# Patient Record
Sex: FEMALE | Race: WHITE | NOT HISPANIC OR LATINO | Employment: OTHER | URBAN - METROPOLITAN AREA
[De-identification: names, ages, dates, MRNs, and addresses within clinical notes are randomized per-mention and may not be internally consistent; named-entity substitution may affect disease eponyms.]

---

## 2021-05-25 ENCOUNTER — APPOINTMENT (OUTPATIENT)
Dept: CT IMAGING | Facility: HOSPITAL | Age: 46
End: 2021-05-25

## 2021-05-25 ENCOUNTER — APPOINTMENT (OUTPATIENT)
Dept: GENERAL RADIOLOGY | Facility: HOSPITAL | Age: 46
End: 2021-05-25

## 2021-05-25 ENCOUNTER — HOSPITAL ENCOUNTER (EMERGENCY)
Facility: HOSPITAL | Age: 46
Discharge: HOME | End: 2021-05-25
Attending: EMERGENCY MEDICINE | Admitting: EMERGENCY MEDICINE

## 2021-05-25 VITALS
SYSTOLIC BLOOD PRESSURE: 146 MMHG | RESPIRATION RATE: 20 BRPM | OXYGEN SATURATION: 96 % | HEIGHT: 71 IN | HEART RATE: 86 BPM | BODY MASS INDEX: 31.5 KG/M2 | WEIGHT: 225 LBS | TEMPERATURE: 98 F | DIASTOLIC BLOOD PRESSURE: 86 MMHG

## 2021-05-25 DIAGNOSIS — S13.4XXA WHIPLASH INJURY TO NECK, INITIAL ENCOUNTER: ICD-10-CM

## 2021-05-25 DIAGNOSIS — S16.1XXA CERVICAL STRAIN, INITIAL ENCOUNTER: Primary | ICD-10-CM

## 2021-05-25 DIAGNOSIS — Z04.1 ENCOUNTER FOR EXAMINATION FOLLOWING MOTOR VEHICLE COLLISION (MVC): ICD-10-CM

## 2021-05-25 DIAGNOSIS — M25.511 ACUTE PAIN OF RIGHT SHOULDER: ICD-10-CM

## 2021-05-25 PROCEDURE — 25010000002 KETOROLAC TROMETHAMINE PER 15 MG: Performed by: PHYSICIAN ASSISTANT

## 2021-05-25 PROCEDURE — 96375 TX/PRO/DX INJ NEW DRUG ADDON: CPT

## 2021-05-25 PROCEDURE — 96374 THER/PROPH/DIAG INJ IV PUSH: CPT

## 2021-05-25 PROCEDURE — 73030 X-RAY EXAM OF SHOULDER: CPT

## 2021-05-25 PROCEDURE — 25010000002 ONDANSETRON PER 1 MG: Performed by: PHYSICIAN ASSISTANT

## 2021-05-25 PROCEDURE — 99283 EMERGENCY DEPT VISIT LOW MDM: CPT

## 2021-05-25 PROCEDURE — 72125 CT NECK SPINE W/O DYE: CPT

## 2021-05-25 RX ORDER — CYCLOBENZAPRINE HCL 10 MG
10 TABLET ORAL ONCE
Status: COMPLETED | OUTPATIENT
Start: 2021-05-25 | End: 2021-05-25

## 2021-05-25 RX ORDER — KETOROLAC TROMETHAMINE 15 MG/ML
15 INJECTION, SOLUTION INTRAMUSCULAR; INTRAVENOUS ONCE
Status: COMPLETED | OUTPATIENT
Start: 2021-05-25 | End: 2021-05-25

## 2021-05-25 RX ORDER — CYCLOBENZAPRINE HCL 10 MG
10 TABLET ORAL 3 TIMES DAILY PRN
Qty: 9 TABLET | Refills: 0 | Status: SHIPPED | OUTPATIENT
Start: 2021-05-25 | End: 2021-05-28

## 2021-05-25 RX ORDER — ONDANSETRON 2 MG/ML
4 INJECTION INTRAMUSCULAR; INTRAVENOUS ONCE
Status: COMPLETED | OUTPATIENT
Start: 2021-05-25 | End: 2021-05-25

## 2021-05-25 RX ADMIN — KETOROLAC TROMETHAMINE 15 MG: 15 INJECTION, SOLUTION INTRAMUSCULAR; INTRAVENOUS at 18:30

## 2021-05-25 RX ADMIN — CYCLOBENZAPRINE HYDROCHLORIDE 10 MG: 10 TABLET, FILM COATED ORAL at 18:29

## 2021-05-25 RX ADMIN — ONDANSETRON 4 MG: 2 INJECTION INTRAMUSCULAR; INTRAVENOUS at 17:48
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
## 2021-06-28 ENCOUNTER — TRANSCRIBE ORDERS (OUTPATIENT)
Dept: ADMINISTRATIVE | Facility: HOSPITAL | Age: 46
End: 2021-06-28

## 2021-06-28 DIAGNOSIS — Z12.31 VISIT FOR SCREENING MAMMOGRAM: Primary | ICD-10-CM

## 2021-07-21 ENCOUNTER — HOSPITAL ENCOUNTER (OUTPATIENT)
Dept: MAMMOGRAPHY | Facility: HOSPITAL | Age: 46
Discharge: HOME | End: 2021-07-21

## 2021-07-21 DIAGNOSIS — Z12.31 VISIT FOR SCREENING MAMMOGRAM: ICD-10-CM

## 2022-08-01 ENCOUNTER — TRANSCRIBE ORDERS (OUTPATIENT)
Dept: ADMINISTRATIVE | Facility: HOSPITAL | Age: 47
End: 2022-08-01

## 2022-08-01 DIAGNOSIS — Z12.31 ENCOUNTER FOR SCREENING MAMMOGRAM FOR BREAST CANCER: Primary | ICD-10-CM

## 2022-10-10 ENCOUNTER — HOSPITAL ENCOUNTER (OUTPATIENT)
Dept: MAMMOGRAPHY | Facility: HOSPITAL | Age: 47
Discharge: HOME | End: 2022-10-10
Admitting: NURSE PRACTITIONER

## 2022-10-10 ENCOUNTER — APPOINTMENT (OUTPATIENT)
Dept: OTHER | Facility: HOSPITAL | Age: 47
End: 2022-10-10

## 2022-10-10 DIAGNOSIS — Z12.31 ENCOUNTER FOR SCREENING MAMMOGRAM FOR BREAST CANCER: ICD-10-CM

## 2022-10-10 PROCEDURE — 77063 BREAST TOMOSYNTHESIS BI: CPT | Performed by: RADIOLOGY

## 2022-10-10 PROCEDURE — 77067 SCR MAMMO BI INCL CAD: CPT

## 2022-10-10 PROCEDURE — 77067 SCR MAMMO BI INCL CAD: CPT | Performed by: RADIOLOGY

## 2022-10-10 PROCEDURE — 77063 BREAST TOMOSYNTHESIS BI: CPT

## 2023-09-12 ENCOUNTER — TRANSCRIBE ORDERS (OUTPATIENT)
Dept: ADMINISTRATIVE | Facility: HOSPITAL | Age: 48
End: 2023-09-12
Payer: COMMERCIAL

## 2023-09-12 DIAGNOSIS — Z12.31 ENCOUNTER FOR SCREENING MAMMOGRAM FOR BREAST CANCER: Primary | ICD-10-CM

## 2023-11-28 ENCOUNTER — HOSPITAL ENCOUNTER (OUTPATIENT)
Dept: MAMMOGRAPHY | Facility: HOSPITAL | Age: 48
Discharge: HOME | End: 2023-11-28
Admitting: NURSE PRACTITIONER
Payer: COMMERCIAL

## 2023-11-28 DIAGNOSIS — Z12.31 ENCOUNTER FOR SCREENING MAMMOGRAM FOR BREAST CANCER: ICD-10-CM

## 2023-11-28 PROCEDURE — 77067 SCR MAMMO BI INCL CAD: CPT

## 2023-11-28 PROCEDURE — 77063 BREAST TOMOSYNTHESIS BI: CPT

## 2024-01-24 ENCOUNTER — HOSPITAL ENCOUNTER (OUTPATIENT)
Dept: MAMMOGRAPHY | Facility: HOSPITAL | Age: 49
Discharge: HOME | End: 2024-01-24
Admitting: RADIOLOGY
Payer: COMMERCIAL

## 2024-01-24 DIAGNOSIS — R92.8 ABNORMAL MAMMOGRAM OF LEFT BREAST: ICD-10-CM

## 2024-01-24 PROCEDURE — G0279 TOMOSYNTHESIS, MAMMO: HCPCS

## 2024-01-24 PROCEDURE — 77065 DX MAMMO INCL CAD UNI: CPT

## 2024-11-20 ENCOUNTER — TRANSCRIBE ORDERS (OUTPATIENT)
Dept: ADMINISTRATIVE | Facility: HOSPITAL | Age: 49
End: 2024-11-20
Payer: COMMERCIAL

## 2024-11-20 DIAGNOSIS — Z12.31 ENCOUNTER FOR SCREENING MAMMOGRAM FOR BREAST CANCER: Primary | ICD-10-CM

## 2024-11-25 LAB
NCCN CRITERIA FLAG: NORMAL
TYRER CUZICK SCORE: 11.7

## 2024-12-23 ENCOUNTER — HOSPITAL ENCOUNTER (OUTPATIENT)
Facility: HOSPITAL | Age: 49
Discharge: HOME | End: 2024-12-23
Admitting: NURSE PRACTITIONER
Payer: COMMERCIAL

## 2024-12-23 DIAGNOSIS — Z12.31 ENCOUNTER FOR SCREENING MAMMOGRAM FOR BREAST CANCER: ICD-10-CM

## 2024-12-23 PROCEDURE — 77067 SCR MAMMO BI INCL CAD: CPT

## 2024-12-23 PROCEDURE — 77063 BREAST TOMOSYNTHESIS BI: CPT

## 2025-02-17 ENCOUNTER — HOSPITAL ENCOUNTER (OUTPATIENT)
Facility: HOSPITAL | Age: 50
Discharge: HOME | End: 2025-02-17
Payer: COMMERCIAL

## 2025-02-17 DIAGNOSIS — R92.8 ABNORMAL MAMMOGRAM: ICD-10-CM

## 2025-02-17 PROCEDURE — 77065 DX MAMMO INCL CAD UNI: CPT

## 2025-02-17 PROCEDURE — 77061 BREAST TOMOSYNTHESIS UNI: CPT | Performed by: RADIOLOGY

## 2025-02-17 PROCEDURE — 77065 DX MAMMO INCL CAD UNI: CPT | Performed by: RADIOLOGY

## 2025-02-17 PROCEDURE — 76642 ULTRASOUND BREAST LIMITED: CPT | Performed by: RADIOLOGY

## 2025-02-17 PROCEDURE — 76642 ULTRASOUND BREAST LIMITED: CPT

## 2025-02-17 PROCEDURE — G0279 TOMOSYNTHESIS, MAMMO: HCPCS

## 2025-02-18 ENCOUNTER — TRANSCRIBE ORDERS (OUTPATIENT)
Dept: ADMINISTRATIVE | Facility: HOSPITAL | Age: 50
End: 2025-02-18
Payer: COMMERCIAL

## 2025-02-18 DIAGNOSIS — R92.8 ABNORMAL MAMMOGRAM: Primary | ICD-10-CM

## 2025-02-27 ENCOUNTER — HOSPITAL ENCOUNTER (OUTPATIENT)
Facility: HOSPITAL | Age: 50
Discharge: HOME | End: 2025-02-27
Payer: COMMERCIAL

## 2025-02-27 DIAGNOSIS — R92.8 ABNORMAL MAMMOGRAM: ICD-10-CM

## 2025-02-27 PROCEDURE — 25010000002 LIDOCAINE 1% - EPINEPHRINE 1:100000 1 %-1:100000 SOLUTION: Performed by: RADIOLOGY

## 2025-02-27 PROCEDURE — 25010000002 LIDOCAINE 1 % SOLUTION: Performed by: RADIOLOGY

## 2025-02-27 PROCEDURE — A4648 IMPLANTABLE TISSUE MARKER: HCPCS

## 2025-02-27 RX ORDER — LIDOCAINE HYDROCHLORIDE AND EPINEPHRINE 10; 10 MG/ML; UG/ML
10 INJECTION, SOLUTION INFILTRATION; PERINEURAL ONCE
Status: COMPLETED | OUTPATIENT
Start: 2025-02-27 | End: 2025-02-27

## 2025-02-27 RX ORDER — LIDOCAINE HYDROCHLORIDE 10 MG/ML
5 INJECTION, SOLUTION INFILTRATION; PERINEURAL ONCE
Status: COMPLETED | OUTPATIENT
Start: 2025-02-27 | End: 2025-02-27

## 2025-02-27 RX ADMIN — LIDOCAINE HYDROCHLORIDE 1 ML: 10 INJECTION, SOLUTION INFILTRATION; PERINEURAL at 10:50

## 2025-02-27 RX ADMIN — LIDOCAINE HYDROCHLORIDE,EPINEPHRINE BITARTRATE 1 ML: 10; .01 INJECTION, SOLUTION INFILTRATION; PERINEURAL at 10:51

## 2025-02-27 NOTE — PROGRESS NOTES
Alert and oriented. Patient's spouse present for post procedure instructions. Denies discomfort, no active bleeding, steri-strips not visualized, gauze dressing intact. Cold pack applied to breast over bandage. Cold pack given. Questions answered, support given. Verbalizes and demonstrates understanding of post-care instructions, written copy given.

## 2025-03-04 ENCOUNTER — TELEPHONE (OUTPATIENT)
Facility: HOSPITAL | Age: 50
End: 2025-03-04
Payer: COMMERCIAL

## 2025-03-04 LAB — REF LAB TEST METHOD: NORMAL

## 2025-03-04 NOTE — TELEPHONE ENCOUNTER
Patient notified of cytology results and recommendation. Verbalizes understanding. Denies discomfort. Denies signs and symptoms of infection. Questions answered, support given, verbalized understanding. [employer] message sent to Dr TALITA Shultz to follow-up on patient being scheduled for left US biopsy. Patient is to call back if she hasn't heard from scheduling by noon on 3.6.25. Verbalized understanding.

## 2025-03-06 ENCOUNTER — TRANSCRIBE ORDERS (OUTPATIENT)
Dept: ADMINISTRATIVE | Facility: HOSPITAL | Age: 50
End: 2025-03-06
Payer: COMMERCIAL

## 2025-03-06 DIAGNOSIS — R92.8 ABNORMAL MAMMOGRAM: Primary | ICD-10-CM

## 2025-03-14 ENCOUNTER — HOSPITAL ENCOUNTER (OUTPATIENT)
Facility: HOSPITAL | Age: 50
Discharge: HOME | End: 2025-03-14
Payer: COMMERCIAL

## 2025-03-14 DIAGNOSIS — R92.8 ABNORMAL MAMMOGRAM: ICD-10-CM

## 2025-03-14 PROCEDURE — A4648 IMPLANTABLE TISSUE MARKER: HCPCS

## 2025-03-14 PROCEDURE — 25010000002 LIDOCAINE 1% - EPINEPHRINE 1:100000 1 %-1:100000 SOLUTION: Performed by: RADIOLOGY

## 2025-03-14 PROCEDURE — 25010000002 LIDOCAINE 1 % SOLUTION: Performed by: RADIOLOGY

## 2025-03-14 RX ORDER — LIDOCAINE HYDROCHLORIDE AND EPINEPHRINE 10; 10 MG/ML; UG/ML
10 INJECTION, SOLUTION INFILTRATION; PERINEURAL ONCE
Status: COMPLETED | OUTPATIENT
Start: 2025-03-14 | End: 2025-03-14

## 2025-03-14 RX ORDER — LIDOCAINE HYDROCHLORIDE 10 MG/ML
5 INJECTION, SOLUTION INFILTRATION; PERINEURAL ONCE
Status: COMPLETED | OUTPATIENT
Start: 2025-03-14 | End: 2025-03-14

## 2025-03-14 RX ADMIN — LIDOCAINE HYDROCHLORIDE 2 ML: 10 INJECTION, SOLUTION INFILTRATION; PERINEURAL at 12:28

## 2025-03-14 RX ADMIN — LIDOCAINE HYDROCHLORIDE,EPINEPHRINE BITARTRATE 1 ML: 10; .01 INJECTION, SOLUTION INFILTRATION; PERINEURAL at 12:29

## 2025-03-14 NOTE — PROGRESS NOTES
"Alert and oriented. Patient's spouse present for post procedure instructions. Denies discomfort, no active bleeding, steri-strips intact.  2 -3 cm healing skin abrasion noted at 2:00 on left breast, per patient \"from paper tape\". Declines gauze dressing & paper tape. Cold pack applied to breast over steri-strips. Cold pack given. Questions answered, support given. Verbalizes and demonstrates understanding of post-care instructions, written copy given. Patient requests Dr TANISHA Petersen for surgical consult if needed.        "

## 2025-03-19 ENCOUNTER — TELEPHONE (OUTPATIENT)
Facility: HOSPITAL | Age: 50
End: 2025-03-19
Payer: COMMERCIAL

## 2025-03-19 LAB
CYTO UR: NORMAL
LAB AP CASE REPORT: NORMAL
LAB AP CLINICAL INFORMATION: NORMAL
LAB AP DIAGNOSIS COMMENT: NORMAL
PATH REPORT.FINAL DX SPEC: NORMAL
PATH REPORT.GROSS SPEC: NORMAL

## 2025-03-19 NOTE — TELEPHONE ENCOUNTER
Referring provider notified via phone call to office. Notified [facility] returned as cancer and patient will be notified. Patient notified of pathology results and recommendation. Verbalizes understanding. Denies discomfort. Denies signs and symptoms of infection.     Patient previously requested  Dr TANISHA Petersen. Patient notified of surgical consult appointment on 4.2.25 @ 1100 with arrival time of 1045, at the [facility], [address]. Patient given office contact & location information. Patient told she will receive an information packet from [facility] with detailed location instructions. Told to bring photo ID, list of prescription & OTC medications, insurance information. May be accompanied by family member or friend for support. Patient verbalized understanding.     Reviewed what would be discussed at surgical consult visit, including detailed explanation of pathology report & imaging reports; treatment options & pros/cons, availability of breast nurse navigator. Patient encouraged to contact surgeon's office with questions or concerns. Breast cancer information packet offered and accepted. Patient verbalized understanding. Patient information sent to breast cancer nurse navigators for evaluation.

## 2025-04-01 ENCOUNTER — TRANSCRIBE ORDERS (OUTPATIENT)
Dept: PHYSICAL THERAPY | Facility: HOSPITAL | Age: 50
End: 2025-04-01
Payer: COMMERCIAL

## 2025-04-01 DIAGNOSIS — Z17.0 MALIGNANT NEOPLASM OF UPPER-INNER QUADRANT OF LEFT BREAST IN FEMALE, ESTROGEN RECEPTOR POSITIVE: Primary | ICD-10-CM

## 2025-04-01 DIAGNOSIS — C50.912 MALIGNANT NEOPLASM OF LEFT FEMALE BREAST, UNSPECIFIED ESTROGEN RECEPTOR STATUS, UNSPECIFIED SITE OF BREAST: Primary | ICD-10-CM

## 2025-04-01 DIAGNOSIS — C50.212 MALIGNANT NEOPLASM OF UPPER-INNER QUADRANT OF LEFT BREAST IN FEMALE, ESTROGEN RECEPTOR POSITIVE: Primary | ICD-10-CM

## 2025-04-02 ENCOUNTER — PATIENT OUTREACH (OUTPATIENT)
Dept: OTHER | Facility: HOSPITAL | Age: 50
End: 2025-04-02
Payer: COMMERCIAL

## 2025-04-02 ENCOUNTER — LAB (OUTPATIENT)
Dept: LAB | Facility: HOSPITAL | Age: 50
End: 2025-04-02
Payer: COMMERCIAL

## 2025-04-02 ENCOUNTER — HOSPITAL ENCOUNTER (OUTPATIENT)
Dept: PHYSICAL THERAPY | Facility: HOSPITAL | Age: 50
Setting detail: THERAPIES SERIES
Discharge: HOME | End: 2025-04-02
Payer: COMMERCIAL

## 2025-04-02 ENCOUNTER — CLINICAL SUPPORT (OUTPATIENT)
Facility: HOSPITAL | Age: 50
End: 2025-04-02
Payer: COMMERCIAL

## 2025-04-02 DIAGNOSIS — Z80.3 FAMILY HISTORY OF MALIGNANT NEOPLASM OF BREAST: ICD-10-CM

## 2025-04-02 DIAGNOSIS — C50.212 MALIGNANT NEOPLASM OF UPPER-INNER QUADRANT OF LEFT BREAST IN FEMALE, ESTROGEN RECEPTOR POSITIVE: Primary | ICD-10-CM

## 2025-04-02 DIAGNOSIS — Z13.79 GENETIC TESTING: Primary | ICD-10-CM

## 2025-04-02 DIAGNOSIS — C50.912 MALIGNANT NEOPLASM OF LEFT BREAST IN FEMALE, ESTROGEN RECEPTOR POSITIVE, UNSPECIFIED SITE OF BREAST: ICD-10-CM

## 2025-04-02 DIAGNOSIS — Z17.0 MALIGNANT NEOPLASM OF LEFT BREAST IN FEMALE, ESTROGEN RECEPTOR POSITIVE, UNSPECIFIED SITE OF BREAST: ICD-10-CM

## 2025-04-02 DIAGNOSIS — Z17.0 MALIGNANT NEOPLASM OF UPPER-INNER QUADRANT OF LEFT BREAST IN FEMALE, ESTROGEN RECEPTOR POSITIVE: Primary | ICD-10-CM

## 2025-04-02 DIAGNOSIS — C50.912 MALIGNANT NEOPLASM OF LEFT FEMALE BREAST, UNSPECIFIED ESTROGEN RECEPTOR STATUS, UNSPECIFIED SITE OF BREAST: Primary | ICD-10-CM

## 2025-04-02 DIAGNOSIS — Z80.41 FAMILY HISTORY OF MALIGNANT NEOPLASM OF OVARY: ICD-10-CM

## 2025-04-02 DIAGNOSIS — Z80.42 FAMILY HISTORY OF MALIGNANT NEOPLASM OF PROSTATE: ICD-10-CM

## 2025-04-02 PROCEDURE — 97162 PT EVAL MOD COMPLEX 30 MIN: CPT

## 2025-04-02 PROCEDURE — 93702 BIS XTRACELL FLUID ANALYSIS: CPT

## 2025-04-02 PROCEDURE — 36415 COLL VENOUS BLD VENIPUNCTURE: CPT

## 2025-04-02 NOTE — THERAPY DISCHARGE NOTE
Outpatient Physical Therapy Lymphedema Initial Evaluation & Discharge  [facility]     Patient Name: Tanvi Barrett  : 1975  MRN: [id number]  Today's Date: 2025      Visit Date: 2025    Visit Dx:    ICD-10-CM ICD-9-CM   1. Malignant neoplasm of left female breast, unspecified estrogen receptor status, unspecified site of breast  C50.912 174.9       There is no problem list on file for this patient.       Past Medical History:   Diagnosis Date    Depression     Hyperlipidemia     Hypertension         Past Surgical History:   Procedure Laterality Date    AUGMENTATION MAMMAPLASTY Bilateral     2021    CHOLECYSTECTOMY          Patient History       Row Name 25 1230             History    Brief Description of Current Complaint BrCA presurgical evaluation  -CA         Pain     Pain at Present 0  -CA         Services    Prior Rehab/Home Health Experiences No  -CA      Are you currently receiving Home Health services No  -CA      Do you plan to receive Home Health services in the near future No  -CA         Daily Activities    Primary Language English  -CA      Are you able to read Yes  -CA      Are you able to write Yes  -CA      How does patient learn best? Pictures/Video;Demonstration;Reading;Listening  -CA      Pt Participated in POC and Goals Yes  -CA         Safety    Are you being hurt, hit, or frightened by anyone at home or in your life? No  -CA      Are you being neglected by a caregiver No  -CA                User Key  (r) = Recorded By, (t) = Taken By, (c) = Cosigned By      Initials Name Provider Type    Denise Mackenzie PT Physical Therapist                     Lymphedema       Row Name 25 5819             Subjective Pain    Able to rate subjective pain? yes  -CA      Pre-Treatment Pain Level 0  -CA      Post-Treatment Pain Level 0  -CA         Subjective    Subjective Comments Pt has been dx with L sided breast CA. She plans to undergo a L lumpectomy with SLNB.  She has h/o breast augmentation. She will be undergoing additional imaging and genetic testing. She presents with her spouse. Notes she has been struggling with her emotions since her diagnosis. She is smiling and joking with her spouse while with PT.  -CA         Lymphedema Assessment    Lymphedema Classification LUE:;at risk/stage 0  -CA      Lymphedema Surgery Comments surgery is TBD  -CA         Posture/Observations    Posture- WNL Posture is WNL  -CA         General ROM    RT Upper Ext Rt Shoulder ABduction;Rt Shoulder Flexion;Rt Shoulder External Rotation;Rt Shoulder Internal Rotation  -CA      LT Upper Ext Lt Shoulder Flexion;Lt Shoulder External Rotation;Lt Shoulder Internal Rotation;Lt Shoulder ABduction  -CA      GENERAL ROM COMMENTS WFL wrist/hand  -CA         Right Upper Ext    Rt Shoulder Abduction AROM WFL  -CA      Rt Shoulder Flexion AROM WFL  -CA      Rt Shoulder External Rotation AROM WFL  -CA      Rt Shoulder Internal Rotation AROM WFL  -CA         Left Upper Ext    Lt Shoulder Abduction AROM WFL  -CA      Lt Shoulder Flexion AROM WFL  -CA      Lt Shoulder External Rotation AROM WFL  -CA      Lt Shoulder Internal Rotation AROM WFL  -CA         MMT (Manual Muscle Testing)    Rt Upper Ext Rt Shoulder Flexion;Rt Shoulder ABduction;Rt Shoulder Internal Rotation;Rt Shoulder External Rotation  -CA      Lt Upper Ext Lt Shoulder Flexion;Lt Shoulder ABduction;Lt Shoulder Internal Rotation;Lt Shoulder External Rotation  -CA         MMT Right Upper Ext    Rt Shoulder Flexion MMT, Gross Movement (4+/5) good plus  -CA      Rt Shoulder ABduction MMT, Gross Movement (4+/5) good plus  -CA      Rt Shoulder Internal Rotation MMT, Gross Movement (4+/5) good plus  -CA      Rt Shoulder External Rotation MMT, Gross Movement (4+/5) good plus  -CA         MMT Left Upper Ext    Lt Shoulder Flexion MMT, Gross Movement (4+/5) good plus  -CA      Lt Shoulder ABduction MMT, Gross Movement (4+/5) good plus  -CA      Lt  Shoulder Internal Rotation MMT, Gross Movement (4+/5) good plus  -CA      Lt Shoulder External Rotation MMT, Gross Movement (4+/5) good plus  -CA         Hand  Strength     Strength Affected Side Bilateral  -CA          Strength Right    Right  Test 1 80  -CA       Strength Average Right 80  -CA          Strength Left    Left  Test 1 70  -CA       Strength Average Left 70  -CA         Lymphedema Edema Assessment    Edema Assessment Comment No edema present at this time.  -CA         Skin Changes/Observations    Location/Assessment Upper Extremity  -CA      Upper Extremity Conditions bilateral:;normal;intact  -CA      Upper Extremity Color/Pigment bilateral:;normal  -CA         Lymphedema Sensation    Lymphedema Sensation Reports RUE:;LUE:  -CA      Lymphedema Sensation Tests light touch  -CA      Lymphedema Light Touch RUE:;LUE:;WNL  -CA         L-Dex Bioimpedence Screening    L-Dex Measurement Extremity LUE  -CA      L-Dex Patient Position Standing  -CA      L-Dex UE Dominate Side Right  -CA      L-Dex UE At Risk Side Left  -CA      L-Dex UE Pre Surgical Value Yes  -CA      L-Dex UE Baseline Score -3.1  -CA      L-Dex UE Comment The patient had SOZO measurement which I reviewed today. The score is in normal limits, see scanned to EMR. Bioimpedance spectroscopy helps identify the onset of lymphedema in an arm or leg before patients experience noticeable swelling. Research has shown that the early detection of lymphedema using L-Dex combined with treatment can reduce progression to chronic lymphedema by 95% in breast cancer patients. Whenever possible, patients are tested for baseline L-Dex score before cancer treatment begins and then are reassessed during regular follow-up visits using the SOZO device. Otherwise, this can be started postoperatively and continued during regular follow-up visits. If the patient’s L-Dex score increases above normal levels, that is a sign that lymphedema  is developing and a referral is made to occupational or physical therapy for further evaluation and early compression treatment. Lymphedema assessment with the SOZO L-Dex score is recommended to be done every 3 months for the first 3 years and then every 6 months for years 4 and 5 followed by annually afterwards.  -CA      Skeletal Muscle Mass (%) 21.7 %  -CA      Fat Mass (%) 42.4 %  -CA      Hy-dex -5.7  -CA                User Key  (r) = Recorded By, (t) = Taken By, (c) = Cosigned By      Initials Name Provider Type    Denise Mackenzie, PT Physical Therapist                    Therapy Education  Education Details: Pt educated on prehab evaluation assessments including bioimpedance. Pt was provided an HEP detailing information including lymphedema, risk reduction, and post op exercise. Pt is concerned about lymphedema, extensive education about monitoring, long term goals, and risk reduction.  Given: HEP, Symptoms/condition management, Posture/body mechanics  Program: New  How Provided: Verbal, Written, Demonstration  Provided to: Patient (and spouse)  Level of Understanding: Verbalized     OP Exercises       Row Name 04/02/25 1230             Subjective    Subjective Comments Pt has been dx with L sided breast CA. She plans to undergo a L lumpectomy with SLNB. She has h/o breast augmentation. She will be undergoing additional imaging and genetic testing. She presents with her spouse. Notes she has been struggling with her emotions since her diagnosis. She is smiling and joking with her spouse while with PT.  -CA         Subjective Pain    Able to rate subjective pain? yes  -CA      Pre-Treatment Pain Level 0  -CA      Post-Treatment Pain Level 0  -CA         Exercise 1    Exercise Name 1 Elbow flexion/extension  -CA      Additional Comments 3-4x/day, HEP level 1- post op day 1 to day 7  -CA         Exercise 2    Exercise Name 2 Fist/wrist circles  -CA      Reps 2 x10  -CA      Additional Comments 3-4x/day,  HEP level 1- post op day 1 to day 7  -CA         Exercise 3    Exercise Name 3 Neck Flexion/extension/rotation/lateral flexion  -CA      Reps 3 x10  -CA      Additional Comments 3-4x/day, HEP level 1- post op day 1 to day 7  -CA         Exercise 4    Exercise Name 4 horizontal abduction with hands behind neck  -CA      Reps 4 x10  -CA      Time 4 5 seconds  -CA      Additional Comments 3-4x/day, HEP level 2- post op day 7 to day 14  -CA         Exercise 5    Exercise Name 5 lumbar trunk rotration  -CA      Reps 5 x10  -CA      Time 5 5 seconds  -CA      Additional Comments 3-4x/day, HEP level 2- post op day 7 to day 14  -CA         Exercise 6    Exercise Name 6 shoulder rolls back  -CA      Reps 6 x10  -CA      Time 6 5 seconds  -CA      Additional Comments 3-4x/day, HEP level 2- post op day 7 to day 14  -CA         Exercise 7    Exercise Name 7 horizontal abduction/adduction with elbows extended  -CA      Reps 7 x10  -CA      Time 7 5  -CA      Additional Comments 3-4x/day, HEP level 2- post op day 7 to day 14  -CA         Exercise 8    Exercise Name 8 wall walks for shoulder flexion  -CA      Reps 8 x10  -CA      Additional Comments 3-4x/day, HEP level 3- post op day 14 until motion is returned  -CA         Exercise 9    Exercise Name 9 IR with hands behind back  -CA      Reps 9 x10  -CA      Additional Comments 3-4x/day, HEP level 3- post op day 14 until motion is returned  -CA         Exercise 10    Exercise Name 10 Horizontal abduction with hands behind head  -CA      Reps 10 x10  -CA      Additional Comments 3-4x/day, HEP level 3- post op day 14 until motion is returned  -CA         Exercise 11    Exercise Name 11 PNF D1 shoulder flexion  -CA      Reps 11 x10  -CA      Additional Comments 3-4x/day, HEP level 3- post op day 14 until motion is returned  -CA         Exercise 12    Exercise Name 12 Trunk stretch with shoulder abduction  -CA      Reps 12 x10  -CA      Time 12 5 seconds  -CA      Additional Comments  3-4x/day, HEP level 3- post op day 14 until motion is returned  -CA                User Key  (r) = Recorded By, (t) = Taken By, (c) = Cosigned By      Initials Name Provider Type    Denise Mackenzie, PT Physical Therapist                     PT OP Goals       Row Name 04/02/25 1230          Long Term Goals    LTG Date to Achieve 04/02/25  -CA     LTG 1 Pt demonstrates awareness of post-operative movement restrictions and HEP to facilitate lymphatic regeneration and reduce the risk of seroma formation, axillary web syndrome, and lymphedema while ensuring shoulder joint mobility.  -CA     LTG 1 Progress Met  -CA     LTG 2 Pt demonstrates understanding of post-operative basic lymphedema precautions  -CA     LTG 2 Progress Met  -CA        Time Calculation    PT Goal Re-Cert Due Date 04/02/25  -CA               User Key  (r) = Recorded By, (t) = Taken By, (c) = Cosigned By      Initials Name Provider Type    Denise Mackenzie, PT Physical Therapist                     PT Assessment/Plan       Row Name 04/02/25 1230          PT Assessment    Assessment Comments This patient presents to PT pre-operatively for planned BrCA surgery scheduled in a couple weeks. Baseline ROM, postural, and bioimpedance measurements were taken today to be compared to measurements retaken 3-4 weeks post surgery. At that time, any reduced movement, decline in function, or postural issues will be addressed with skilled care and new goals will be established.  Personal risk factors for lymphedema post-operatively for the L upper extremity and trunk quadrant were also assessed today and basic lymphedema precautions were discussed. A more detailed discussion regarding personal lymphedema risk factors can take place post-operatively once the number of lymph nodes removed and the plan for further medical care is known.  -CA     Please refer to paper survey for additional self-reported information Yes  -CA     Rehab Potential Good  -CA      Patient/caregiver participated in establishment of treatment plan and goals Yes  -CA     Patient would benefit from skilled therapy intervention Yes  -CA        PT Plan    PT Frequency One time visit  -CA     Planned CPT's? PT EVAL MOD COMPLELITY: 14815;PT BIS XTRACELL FLUID ANALYSIS: 02369  -CA     PT Plan Comments This patient may return to PT 3-4 weeks post operatively for re-evaluation measurements to be compared to measurements taken today, at her pre-operative evaluation. In addition, she can be examined for possible post-BrCA surgery sequelae such as axillary web syndrome, scar adhesions, edema, worsened posture, scapular winging, pain, and reduced ROM and function. At that time, a future plan and goals will be established and skilled care continued if indicated. Currently, she has been provided with information before BrCA surgery in order to facilitate recovery and reduce the risk of post-operative sequelae.  -CA               User Key  (r) = Recorded By, (t) = Taken By, (c) = Cosigned By      Initials Name Provider Type    Denise Mackenzie PT Physical Therapist                     Outcome Measure Options: Quick DASH, Timed Up and Go (TUG)  Quick DASH  Open a tight or new jar.: No Difficulty  Do heavy household chores (e.g., wash walls, wash floors): No Difficulty  Carry a shopping bag or briefcase: No Difficulty  Wash your back: No Difficulty  Use a knife to cut food: No Difficulty  Recreational activities in which you take some force or impact through your arm, should or hand (e.g. golf, hammering, tennis, etc.): No Difficulty  During the past week, to what extent has your arm, shoulder, or hand problem interfered with your normal social activites with family, friends, neighbors or groups?: Not at all  During the past week, were you limited in your work or other regular daily activities as a result of your arm, shoulder or hand problem?: Not limited at all  Arm, Shoulder, or hand pain:  None  Tingling (pins and needles) in your arm, shoulder, or hand: None  During the past week, how much difficulty have you had sleeping because of the pain in your arm, shoulder or hand?: No difficulty  Number of Questions Answered: 11  Quick DASH Score: 0  Timed Up and Go (TUG)  TUG Test 1: 7.18 seconds      Time Calculation:   Start Time: 1230  Stop Time: 1315  Time Calculation (min): 45 min  Untimed Charges  PT Eval/Re-eval Minutes: 45  Total Minutes  Untimed Charges Total Minutes: 45   Total Minutes: 45   Time calculation does not account for 15 minutes documentation time    Therapy Charges for Today       Code Description Service Date Service Provider Modifiers Qty    67192167527 HC PT EVAL MOD COMPLEXITY 4 4/2/2025 Denise Maher, PT GP 1    [id number] HC PT BIS XTRACELL FLUID ANALYSIS 4/2/2025 Denise Maher, PT  1            PT G-Codes  Outcome Measure Options: Quick DASH, Timed Up and Go (TUG)  Quick DASH Score: 0  TUG Test 1: 7.18 seconds            Denise Maher PT  4/2/2025

## 2025-04-02 NOTE — PROGRESS NOTES
Genetic counseling was provided via telehealth.  Patient's name and date of birth was confirmed and confirmed that patient was physically located in [place] at the time of the appointment.    Tanvi Barrett, a 50 y.o. female, was referred for genetic counseling due to a personal history of breast cancer. She was recently diagnosed with a left breast cancer at age 50. Ms. Barrett retains her uterus and ovaries. She had her first colonoscopy last year and polyps were not identified. She was interested in discussing her risk for a hereditary cancer syndrome. Ms. Barrett was interested in pursuing a multigene panel, and therefore the CancerNext panel was ordered through [facility] which analyzes BRCA1/2 and 37 additional genes associated with an increased cancer risk.     FAMILY HISTORY:  Father:   Lung cancer,  late 60s (lifetime smoker)  Pat. Great Aunt: Breast cancer  Pat. Great Aunt: Breast cancer  Pat. Great Aunt: Ovarian cancer  Mat. Uncle:  Prostate cancer, 70s    We do not have medical records confirming the diagnoses in Ms. Barrett's family.    RISK ASSESSMENT: Ms. Barrett's personal history of breast cancer led to concern regarding a hereditary cancer syndrome.  She meets NCCN guidelines criteria for BRCA1/2 testing based on her personal history of breast cancer diagnosed before age 50 and family history of breast and ovarian cancer. The standard approach to genetic testing is through a multigene panel.     GENETIC COUNSELING:  We reviewed the family history information in detail.  Cases of cancer follow three general patterns: sporadic, familial, and hereditary.  While most cancer is sporadic, some cases appear to occur in family clusters.  These cases are said to be familial and account for 10-20% of certain cancer cases.  Familial cases may be due to a combination of shared genes and environmental factors among family members.  In even fewer families (~10%), the cancer is said to be inherited, and the genes  responsible for the cancer are known.      Family histories typical of hereditary cancer syndromes usually include multiple first- and second-degree relatives diagnosed with cancer types that define a syndrome.  These cases tend to be diagnosed at younger-than-expected ages and can be bilateral or multifocal.  The cancer in these families follows an autosomal dominant inheritance pattern, which indicates the likely presence of a mutation in a cancer susceptibility gene.  Children and siblings of an individual believed to carry this mutation have a 50% chance of inheriting that mutation, thereby inheriting the increased risk to develop cancer.  These mutations can be passed down from the maternal or the paternal lineage.    Hereditary breast cancer accounts for 5-10% of all cases of breast cancer.  A significant proportion of hereditary breast and ovarian cancer can be attributed to mutations in the BRCA1 and BRCA2 genes.  Mutations in these genes confer an increased risk for breast cancer, ovarian cancer, male breast cancer, prostate cancer, and pancreatic cancer. Women with a BRCA1 or BRCA2 mutation who have already been diagnosed with breast cancer have a 40-60% lifetime risk of a second breast cancer. Women with a BRCA1 or BRCA2 mutation have up to a 44% risk of ovarian cancer.      The standard approach to genetic testing is via a multigene panel.  Genes included on these panels have varying degrees of risk associated, and management and screening guidelines vary based on the specific gene.  Hereditary cancer syndromes can demonstrate incomplete penetrance and variable expression within families. There are genes that are evaluated that have been more recently described, and there may be less data regarding the risks and therefore may not have established management guidelines at this time. We reviewed that in some cases, the identification of a genetic mutation may impact treatment options for some types of  cancer. We discussed the possibility of results that are unexpected based on family history. We discussed these limitations at length. Based on Ms. Barrett's personal history and her desire to get more information regarding her personal risks and risks for her family, she opted to pursue testing through a panel evaluating several other genes known to increase the risk for cancer.     GENETIC TESTING:  The risks, benefits and limitations of genetic testing and implications for clinical management following testing were reviewed. DNA test results can influence decisions regarding screening and prevention.  Genetic testing can have significant psychological implications for both individuals and families. Also discussed was the possibility of employment and insurance discrimination based on genetic test results and the federal and states laws that are in place to prevent this (BELL), as well as the limitations of these laws.         We discussed multigene panel testing, which would involve testing several genes associated with an increased cancer risk. The benefits and limitations of genetic testing were discussed and Ms. Barrett decided to pursue testing of the genes via the panel. The implications of a positive or negative test result were discussed.  We discussed the possibility that, in some cases, genetic test results may be ambiguous due to the identification of a genetic variant of uncertain significance (VUS). These variants may or may not be associated with an increased cancer risk. With multigene panel testing, it is not uncommon for a VUS to be identified.  If a VUS is identified, testing family members is not recommended and screening recommendations are made based on the family history.  The laboratories that perform genetic testing work to reclassify the VUS and send out an amended report if and when a VUS is reclassified.  The majority of variant findings are ultimately reclassified to a negative result. Given  her personal and family history, a negative test result does not eliminate all cancer risk to her relatives, although the risk would not be as high as it would with positive genetic testing.     PLAN:  Genetic testing via the BRCAplus panel and CancerNext panel through [facility] was ordered. A blood sample will be collected later today at [facility]. Results from the high/moderate risk breast cancer genes (BRCAplus panel) are expected in 7-10 days, and results from the remainder of the panel are expected in 2-3 weeks.  Ms. Barrett is welcome to contact us in the meantime with any questions she may have at [phone].       Ela Moore MS, [facility], [facility]  Licensed Certified Genetic Counselor      Total time spent caring for the patient today was 30 minutes. This time includes chart review, time spent during the visit, and time spent after the visit on documentation and follow-up.

## 2025-04-03 NOTE — SIGNIFICANT NOTE
Met patient and her  with Dr. MUKHERJEE to discuss her recent breast cancer diagnosis. Dr. MUKHERJEE reviewed the pathology of left breast IG IDC T1 N0  M0 stage IA ER+ AK+ Her2- and surgical options. Pt had genetic counseling appt this morning and MRI ordered. NN reviewed Resources and education. Bioimpedence eval completed by MONSE Maher PT.     04/03/25 9646   Nurse Navigation   Current Status Active   Type of Visit New patient   Location of Visit [facility]   Visit Diagnosis Breast - malignant   Referral Source Health professional - outpatient   Treatments Surgery   Date of Diagnosis 03/19/25   Surgery - First Consult Appointment 04/02/25   Barriers to Care Emotional   Practical Needs Nurse Navigator Referral;Social Work Referral   Emotional Needs emotional suppport/coping strategies   Intervention Tasks Performed Education;Symptom management;Community resources;Cancer prevention/Screening;Supportive services referral   Supportive services referral Bioimpedance/Lymphedema;Social Work referral;Genetics referral   Time Navigated Today (Min) 70   Total Time Navigated (Min) 70   Acuity Rating   Time spent with patient 3- greater than 45 minutes   Multimodality treatment coordination and education 2-  Arrange, transfer care, second opinion   Caregiver support 1- Family/significant other support available   Distress score 2- 4-7   Coordination of care  - appts 2- Multiple appts   Appt compliance 1- Compliant   ECOG/Karnofsky Score 1- ECOG -Less than or equal to 1,  Karnofsky 90 or greater   PHQ 9 score  1- <10 clinical   Referrals to support services 3- 2 or greater   Acuity score 16   Acuity level Medium acuity

## 2025-04-04 ENCOUNTER — DOCUMENTATION (OUTPATIENT)
Dept: OTHER | Facility: HOSPITAL | Age: 50
End: 2025-04-04
Payer: COMMERCIAL

## 2025-04-04 NOTE — PROGRESS NOTES
Distress Screening Follow-up    Name: Tanvi Barrett    : 1975    Diagnosis:     Location of Distress Screening: Breast Surgery     Distress Level: 7 (consult with Dr. MUKHERJEE 2025) (2025 10:00 AM)      Physical Concerns:  Sleep: Y  Fatigue: Y      Emotional Concerns:  Worry or anxiety: Y  Sadness or depression: Y  Fear: Y     Interventions:   Emotional Needs: emotional suppport/coping strategies  Practical Needs: Nurse Navigator Referral; Social Work Referral      Comments:  SW called to follow up with pt regarding recent distress screen results. SW left a VM with pt introducing self, and offering additional support. SW provided call back number, ([phone]) and encouraged pt to reach out at her convenience.     SW will remain available to offer support.     JIN Whitley  Oncology Social Worker

## 2025-04-14 ENCOUNTER — DOCUMENTATION (OUTPATIENT)
Dept: GENETICS | Facility: HOSPITAL | Age: 50
End: 2025-04-14
Payer: COMMERCIAL

## 2025-04-14 ENCOUNTER — TELEPHONE (OUTPATIENT)
Dept: GENETICS | Facility: HOSPITAL | Age: 50
End: 2025-04-14
Payer: COMMERCIAL

## 2025-04-14 NOTE — TELEPHONE ENCOUNTER
Spoke with patient and disclosed negative genetic results. Informed patient these results would be on [employer] and sent to her Dr. Patient declined needing a copy mailed to her.

## 2025-04-14 NOTE — PROGRESS NOTES
Tanvi Barrett, a 50 y.o. female, was referred for genetic counseling due to a personal history of breast cancer. She was recently diagnosed with a left breast cancer at age 50. Ms. Barrett retains her uterus and ovaries. She had her first colonoscopy last year and polyps were not identified. She was interested in discussing her risk for a hereditary cancer syndrome. Ms. Barrett was interested in pursuing a multigene panel, and therefore the CancerNext panel was ordered through [facility] which analyzes BRCA1/2 and 37 additional genes associated with an increased cancer risk. The genes on this panel include APC, MARIYA, AXIN2, BAP1, BARD1, BMPR1A, BRCA1, BRCA2, BRIP1, CDH1, CDKN2A, CHEK2, EPCAM, FH, FLCN, GREM1, HOXB13, MBD4, MET, MLH1, MSH2, MSH3, MSH6, MUTYH, NF1, NTHL1, PALB2, PMS2, POLD1, POLE, PTEN, RAD51C, RAD51D, SMAD4, STK11, TP53, TSC1, TSC2, and VHL. Genetic testing was negative for pathogenic mutations in BRCA1/2 and 37 additional genes included on the CancerNext panel. These results were discussed with Ms. Barrett by telephone on 2025.    FAMILY HISTORY:  Father:                         Lung cancer,  late 60s (lifetime smoker)  Pat. Great Aunt: Breast cancer  Pat. Great Aunt: Breast cancer  Pat. Great Aunt: Ovarian cancer  Mat. Uncle:                  Prostate cancer, 70s     We do not have medical records confirming the diagnoses in Ms. Barrett's family.     RISK ASSESSMENT: Ms. Barrett's personal history of breast cancer led to concern regarding a hereditary cancer syndrome.  She meets NCCN guidelines criteria for BRCA1/2 testing based on her personal history of breast cancer diagnosed before age 50 and family history of breast and ovarian cancer. The standard approach to genetic testing is through a multigene panel.      GENETIC COUNSELING:  We reviewed the family history information in detail.  Cases of cancer follow three general patterns: sporadic, familial, and hereditary.  While most cancer is sporadic,  some cases appear to occur in family clusters.  These cases are said to be familial and account for 10-20% of certain cancer cases.  Familial cases may be due to a combination of shared genes and environmental factors among family members.  In even fewer families (~10%), the cancer is said to be inherited, and the genes responsible for the cancer are known.       Family histories typical of hereditary cancer syndromes usually include multiple first- and second-degree relatives diagnosed with cancer types that define a syndrome.  These cases tend to be diagnosed at younger-than-expected ages and can be bilateral or multifocal.  The cancer in these families follows an autosomal dominant inheritance pattern, which indicates the likely presence of a mutation in a cancer susceptibility gene.  Children and siblings of an individual believed to carry this mutation have a 50% chance of inheriting that mutation, thereby inheriting the increased risk to develop cancer.  These mutations can be passed down from the maternal or the paternal lineage.     Hereditary breast cancer accounts for 5-10% of all cases of breast cancer.  A significant proportion of hereditary breast and ovarian cancer can be attributed to mutations in the BRCA1 and BRCA2 genes.  Mutations in these genes confer an increased risk for breast cancer, ovarian cancer, male breast cancer, prostate cancer, and pancreatic cancer. Women with a BRCA1 or BRCA2 mutation who have already been diagnosed with breast cancer have a 40-60% lifetime risk of a second breast cancer. Women with a BRCA1 or BRCA2 mutation have up to a 44% risk of ovarian cancer.       The standard approach to genetic testing is via a multigene panel.  Genes included on these panels have varying degrees of risk associated, and management and screening guidelines vary based on the specific gene.  Hereditary cancer syndromes can demonstrate incomplete penetrance and variable expression within  families. There are genes that are evaluated that have been more recently described, and there may be less data regarding the risks and therefore may not have established management guidelines at this time. We reviewed that in some cases, the identification of a genetic mutation may impact treatment options for some types of cancer. We discussed the possibility of results that are unexpected based on family history. We discussed these limitations at length. Based on Ms. Barrett's personal history and her desire to get more information regarding her personal risks and risks for her family, she opted to pursue testing through a panel evaluating several other genes known to increase the risk for cancer.      GENETIC TESTING:  The risks, benefits and limitations of genetic testing and implications for clinical management following testing were reviewed. DNA test results can influence decisions regarding screening and prevention.  Genetic testing can have significant psychological implications for both individuals and families. Also discussed was the possibility of employment and insurance discrimination based on genetic test results and the federal and states laws that are in place to prevent this (BELL), as well as the limitations of these laws.         We discussed multigene panel testing, which would involve testing several genes associated with an increased cancer risk. The benefits and limitations of genetic testing were discussed and Ms. Barrett decided to pursue testing of the genes via the panel. The implications of a positive or negative test result were discussed.  We discussed the possibility that, in some cases, genetic test results may be ambiguous due to the identification of a genetic variant of uncertain significance (VUS). These variants may or may not be associated with an increased cancer risk. With multigene panel testing, it is not uncommon for a VUS to be identified.  If a VUS is identified, testing family  members is not recommended and screening recommendations are made based on the family history.  The laboratories that perform genetic testing work to reclassify the VUS and send out an amended report if and when a VUS is reclassified.  The majority of variant findings are ultimately reclassified to a negative result. Given her personal and family history, a negative test result does not eliminate all cancer risk to her relatives, although the risk would not be as high as it would with positive genetic testing.     TEST RESULTS:  Genetic testing was negative for pathogenic mutations by sequencing, rearrangement testing, and RNA analysis for the 39 genes on the CancerNext panel.  The negative result greatly lowers the risk of a hereditary cancer syndrome for Ms. Barrett. This assessment is based on the information provided at the time of the consultation.    CLINICAL MANAGEMENT GUIDELINES: Ms. Zaragoza surveillance and management should be determined by her oncology team. Despite the genetic test results that were negative for known pathogenic mutations, Ms. Barrett's female relatives may have a somewhat increased lifetime risk for breast cancer based on family history. Relatives may have a personalized risk assessment performed to quantify their breast cancer risk using a family history-based risk model, such as the Tyrer-Cuzick model. Surveillance for individuals with a high lifetime risk of breast cancer (>20%), based on NCCN guidelines, would consist of semi-annual clinical breast exams and monthly self-breast exams starting by age 18 and annual mammography starting 10 years younger than the earliest diagnosis in the family, or age 40, whichever is earliest.  According to an American Cancer Society expert panel and NCCN guidelines, annual breast MRI should be offered to women whose lifetime risk of breast cancer is 20-25 percent or more, typically beginning at the same age as mammography.     PLAN:  Genetic counseling  remains available to Ms. Barrett and she can contact us with any questions she may have at [phone].         Ela Moore MS, [employer], [facility]  Licensed Certified Genetic Counselor       Cc:  Kelsey Petersen MD

## 2025-04-17 ENCOUNTER — HOSPITAL ENCOUNTER (OUTPATIENT)
Dept: MRI IMAGING | Facility: HOSPITAL | Age: 50
Discharge: HOME | End: 2025-04-17
Admitting: SURGERY
Payer: COMMERCIAL

## 2025-04-17 DIAGNOSIS — C50.412 CARCINOMA OF UPPER-OUTER QUADRANT OF FEMALE BREAST, LEFT: ICD-10-CM

## 2025-04-17 PROCEDURE — 77049 MRI BREAST C-+ W/CAD BI: CPT

## 2025-04-17 PROCEDURE — 25510000002 GADOBENATE DIMEGLUMINE 529 MG/ML SOLUTION: Performed by: SURGERY

## 2025-04-17 PROCEDURE — A9577 INJ MULTIHANCE: HCPCS | Performed by: SURGERY

## 2025-04-17 RX ADMIN — GADOBENATE DIMEGLUMINE 19 ML: 529 INJECTION, SOLUTION INTRAVENOUS at 11:44

## 2025-04-18 ENCOUNTER — TELEPHONE (OUTPATIENT)
Dept: MRI IMAGING | Facility: HOSPITAL | Age: 50
End: 2025-04-18
Payer: COMMERCIAL

## 2025-04-21 ENCOUNTER — TRANSCRIBE ORDERS (OUTPATIENT)
Dept: ADMINISTRATIVE | Facility: HOSPITAL | Age: 50
End: 2025-04-21
Payer: COMMERCIAL

## 2025-04-21 DIAGNOSIS — C50.412 MALIGNANT NEOPLASM OF UPPER-OUTER QUADRANT OF LEFT FEMALE BREAST, UNSPECIFIED ESTROGEN RECEPTOR STATUS: Primary | ICD-10-CM

## 2025-05-07 ENCOUNTER — PRE-ADMISSION TESTING (OUTPATIENT)
Dept: PREADMISSION TESTING | Facility: HOSPITAL | Age: 50
End: 2025-05-07
Payer: COMMERCIAL

## 2025-05-07 LAB
ALBUMIN SERPL-MCNC: 4.5 G/DL (ref 3.5–5.2)
ALBUMIN/GLOB SERPL: 1.7 G/DL
ALP SERPL-CCNC: 88 U/L (ref 39–117)
ALT SERPL W P-5'-P-CCNC: 97 U/L (ref 1–33)
ANION GAP SERPL CALCULATED.3IONS-SCNC: 11 MMOL/L (ref 5–15)
AST SERPL-CCNC: 69 U/L (ref 1–32)
BILIRUB SERPL-MCNC: 0.7 MG/DL (ref 0–1.2)
BUN SERPL-MCNC: 13 MG/DL (ref 6–20)
BUN/CREAT SERPL: 11.9 (ref 7–25)
CALCIUM SPEC-SCNC: 9.9 MG/DL (ref 8.6–10.5)
CHLORIDE SERPL-SCNC: 101 MMOL/L (ref 98–107)
CO2 SERPL-SCNC: 25 MMOL/L (ref 22–29)
CREAT SERPL-MCNC: 1.09 MG/DL (ref 0.57–1)
DEPRECATED RDW RBC AUTO: 42.4 FL (ref 37–54)
EGFRCR SERPLBLD CKD-EPI 2021: 62.4 ML/MIN/1.73
ERYTHROCYTE [DISTWIDTH] IN BLOOD BY AUTOMATED COUNT: 12.5 % (ref 12.3–15.4)
GLOBULIN UR ELPH-MCNC: 2.6 GM/DL
GLUCOSE SERPL-MCNC: 90 MG/DL (ref 65–99)
HCT VFR BLD AUTO: 44 % (ref 34–46.6)
HGB BLD-MCNC: 14.8 G/DL (ref 12–15.9)
MCH RBC QN AUTO: 31.6 PG (ref 26.6–33)
MCHC RBC AUTO-ENTMCNC: 33.6 G/DL (ref 31.5–35.7)
MCV RBC AUTO: 93.8 FL (ref 79–97)
PLATELET # BLD AUTO: 269 10*3/MM3 (ref 140–450)
PMV BLD AUTO: 9.4 FL (ref 6–12)
POTASSIUM SERPL-SCNC: 4.3 MMOL/L (ref 3.5–5.2)
PROT SERPL-MCNC: 7.1 G/DL (ref 6–8.5)
RBC # BLD AUTO: 4.69 10*6/MM3 (ref 3.77–5.28)
SODIUM SERPL-SCNC: 137 MMOL/L (ref 136–145)
WBC NRBC COR # BLD AUTO: 7.68 10*3/MM3 (ref 3.4–10.8)

## 2025-05-07 PROCEDURE — 80053 COMPREHEN METABOLIC PANEL: CPT

## 2025-05-07 PROCEDURE — 93005 ELECTROCARDIOGRAM TRACING: CPT

## 2025-05-07 PROCEDURE — 85027 COMPLETE CBC AUTOMATED: CPT

## 2025-05-07 PROCEDURE — 36415 COLL VENOUS BLD VENIPUNCTURE: CPT

## 2025-05-07 NOTE — PAT
An arrival time for procedure was not provided during PAT visit. If patient had any questions or concerns about their arrival time, they were instructed to contact their surgeon/physician.  Additionally, if the patient referred to an arrival time that was acquired from their my chart account, patient was encouraged to verify that time with their surgeon/physician. Arrival times are NOT provided in Pre Admission Testing Department.    EKG from PAT today faxed to anesthesiology department for review and cardiac clearance. RN spoke with  Dr. Vick  and reviewed pertinent medical history and EKG results.  Per Dr. Vick, patient is cleared to proceed with procedure as planned without additional cardiac testing. Patient denies chest pain or increased shortness of breath.

## 2025-05-08 LAB
QT INTERVAL: 350 MS
QTC INTERVAL: 446 MS

## 2025-05-12 ENCOUNTER — HOSPITAL ENCOUNTER (OUTPATIENT)
Dept: ULTRASOUND IMAGING | Facility: HOSPITAL | Age: 50
Discharge: HOME | End: 2025-05-12
Payer: COMMERCIAL

## 2025-05-12 ENCOUNTER — HOSPITAL ENCOUNTER (OUTPATIENT)
Dept: MAMMOGRAPHY | Facility: HOSPITAL | Age: 50
Discharge: HOME | End: 2025-05-12
Payer: COMMERCIAL

## 2025-05-12 ENCOUNTER — HOSPITAL ENCOUNTER (OUTPATIENT)
Dept: NUCLEAR MEDICINE | Facility: HOSPITAL | Age: 50
Discharge: HOME | End: 2025-05-12

## 2025-05-12 DIAGNOSIS — C50.412 MALIGNANT NEOPLASM OF UPPER-OUTER QUADRANT OF LEFT FEMALE BREAST, UNSPECIFIED ESTROGEN RECEPTOR STATUS: ICD-10-CM

## 2025-05-12 PROCEDURE — 88341 IMHCHEM/IMCYTCHM EA ADD ANTB: CPT | Performed by: SURGERY

## 2025-05-12 PROCEDURE — 34310000005 TECHNETIUM FILTERED SULFUR COLLOID: Performed by: SURGERY

## 2025-05-12 PROCEDURE — A9541 TC99M SULFUR COLLOID: HCPCS | Performed by: SURGERY

## 2025-05-12 PROCEDURE — 88305 TISSUE EXAM BY PATHOLOGIST: CPT | Performed by: SURGERY

## 2025-05-12 PROCEDURE — 88307 TISSUE EXAM BY PATHOLOGIST: CPT | Performed by: SURGERY

## 2025-05-12 PROCEDURE — 25010000002 LIDOCAINE 1 % SOLUTION: Performed by: RADIOLOGY

## 2025-05-12 PROCEDURE — C1819 TISSUE LOCALIZATION-EXCISION: HCPCS

## 2025-05-12 PROCEDURE — 38792 RA TRACER ID OF SENTINL NODE: CPT

## 2025-05-12 PROCEDURE — 76098 X-RAY EXAM SURGICAL SPECIMEN: CPT

## 2025-05-12 RX ORDER — LIDOCAINE HYDROCHLORIDE 10 MG/ML
5 INJECTION, SOLUTION INFILTRATION; PERINEURAL ONCE
Status: COMPLETED | OUTPATIENT
Start: 2025-05-12 | End: 2025-05-12

## 2025-05-12 RX ADMIN — Medication 1 ML: at 08:41

## 2025-05-12 RX ADMIN — TECHNETIUM TC 99M SULFUR COLLOID 1 DOSE: KIT at 14:34

## 2025-05-13 ENCOUNTER — LAB REQUISITION (OUTPATIENT)
Dept: LAB | Facility: HOSPITAL | Age: 50
End: 2025-05-13
Payer: COMMERCIAL

## 2025-05-13 DIAGNOSIS — C50.412 MALIGNANT NEOPLASM OF UPPER-OUTER QUADRANT OF LEFT FEMALE BREAST: ICD-10-CM

## 2025-05-16 LAB
CYTO UR: NORMAL
LAB AP CASE REPORT: NORMAL
LAB AP CLINICAL INFORMATION: NORMAL
LAB AP DIAGNOSIS COMMENT: NORMAL
LAB AP SYNOPTIC CHECKLIST: NORMAL
PATH REPORT.FINAL DX SPEC: NORMAL
PATH REPORT.GROSS SPEC: NORMAL

## 2025-05-20 NOTE — PROGRESS NOTES
"  Subjective     PROBLEM LIST:  [id number] ER positive (95%, 3+) IA positive (95%, 3+) HER2 negative (1+) invasive ductal carcinoma of the left breast  Left breast lumpectomy on 5/12/2025.  Pathology showed an intermediate grade invasive ductal carcinoma measuring 9 mm.  Less than 0.1 mm anterior margin.  0 of 2 sentinel lymph nodes involved.  Depression  Hyperlipidemia  Hypertension    CHIEF COMPLAINT: Breast cancer      HISTORY OF PRESENT ILLNESS:  The patient is a 50 y.o. female, referred for evaluation of a recently diagnosed breast cancer.    She has had a lumpectomy.  She is recovering well from this.  She is here with her  today.  She lives in [place] and works as a .  They have 1 son still at home who is 18 and others who are grown.    She had a uterine ablation in 2018 for menorrhagia.  She started having symptoms of joint pain and mood changes in her late 30s and has had hot flashes for the past year.  She did have some bleeding after her uterine ablation but no bleeding for the past year.    REVIEW OF SYSTEMS:  A 14 point review of systems was performed and is negative except as noted above.    Past Medical History:   Diagnosis Date    Breast cancer 2025    Left    Depression     Hyperlipidemia     Hypertension                Objective     /80   Pulse 116   Resp 20   Ht 177.8 cm (70\")   Wt 104 kg (230 lb)   BMI 33.00 kg/m²   Performance Status:  ECOG score: 0           General: well appearing female in no acute distress  Neuro: alert and oriented  HEENT: sclerae anicteric, oropharynx clear  Skin: no rashes, lesions, bruising, or petechiae  Psych: mood and affect appropriate    Lab Results   Component Value Date    WBC 7.68 05/07/2025    HGB 14.8 05/07/2025    HCT 44.0 05/07/2025    MCV 93.8 05/07/2025     05/07/2025     Lab Results   Component Value Date    GLUCOSE 90 05/07/2025    BUN 13 05/07/2025    CREATININE 1.09 (H) 05/07/2025    BCR 11.9 05/07/2025    K 4.3 " "05/07/2025    CO2 25.0 05/07/2025    CALCIUM 9.9 05/07/2025    ALBUMIN 4.5 05/07/2025    AST 69 (H) 05/07/2025    ALT 97 (H) 05/07/2025       Mammo Post Device Placement Left  ULTRASOUND GUIDED NEEDLE LOCALIZATION: Left breast     HISTORY: Localization of known biopsy-proven malignancy left breast     PROCEDURE: After obtaining informed consent and performing \"time-out\"  the left breast was prepped and draped in usual sterile fashion. 1%  lidocaine without epinephrine was utilized for local anesthesia. A 5 cm  Kopans needle was placed into the lateral position under direct  sonographic guidance, a wire was then placed through the needle and the  needle was removed from the breast.     Routine left digital mammographic images were obtained with a BB placed  on the skin edge. The mass is located at the distal portion of the thick  part of the wire, about 4 cm from the lateral skin edge. Mammographic  images were available to the operating room. No complications occurred  during this procedure.     SUMMARY: Successful needle localization of known biopsy-proven  malignancy left breast at 11:00.     No BI-RADS category is necessary.     Pathology demonstrated invasive ductal carcinoma, intermediate grade, 9  mm in maximal extent. Limited associated ductal carcinoma in situ, high  grade, micropapillary pattern, not extensive. Invasive carcinoma present  within less than 0.1 mm of anterior margin. DCIS present 2 mm from  anterior margin.     Surgical consultation is recommended.        5/19/2025 9:29 AM by Jess Shultz MD on Workstation: [id number]               ASSESSMENT AND PLAN:     Tanvi Barrett is a 50 y.o. female with a stage Ia ER positive HER2 negative invasive ductal carcinoma of the left breast.    We discussed the use of the Oncotype recurrence score.  This test provides information about the biology and risk of recurrence for ER positive Her2-negative breast cancers.  It is clear from previous studies " that patients who have a low risk Oncotype do not benefit from adjuvant chemotherapy.  Conversely, patients with a high risk Oncotype can have a significant benefit from adjuvant chemotherapy.  Scores in the intermediate risk group may have a small benefit based on the patient's age.  We discussed that the Oncotype test is most useful if chemotherapy is an option that the patient is considering.     Regardless of the decision about chemotherapy, she is a candidate for adjuvant endocrine therapy.   Based on her symptoms and history I think she likely is in menopause and we will do hormone levels today to confirm this.  We discussed adjuvant endocrine therapy with an aromatase inhibitor.  We reviewed the potential side effects of anastrozole including hot flashes, vaginal dryness, joint pain, decrease in bone density, and mood or sleep disturbance.  She is concerned about many of the side effects as she already is experiencing many of the symptoms.  I told her that every person is different in terms of how much this medication affects them and I would encourage her to try the medicine before making any decisions about continuing for a longer period of time.  We also discussed that there is things we can do to help manage many of the side effects.  ]    F/u 2 weeks to review oncotype result.             A total greater than 60 mins minutes was spent in face to face patient time, examination, counseling, charting, reviewing test results, and reviewing outside records.    Estella Craig MD    5/21/2025

## 2025-05-21 ENCOUNTER — LAB (OUTPATIENT)
Dept: LAB | Facility: HOSPITAL | Age: 50
End: 2025-05-21
Payer: COMMERCIAL

## 2025-05-21 ENCOUNTER — OFFICE VISIT (OUTPATIENT)
Dept: RADIATION ONCOLOGY | Facility: HOSPITAL | Age: 50
End: 2025-05-21
Payer: COMMERCIAL

## 2025-05-21 ENCOUNTER — HOSPITAL ENCOUNTER (OUTPATIENT)
Dept: RADIATION ONCOLOGY | Facility: HOSPITAL | Age: 50
Setting detail: RADIATION/ONCOLOGY SERIES
Discharge: HOME | End: 2025-05-21
Payer: COMMERCIAL

## 2025-05-21 ENCOUNTER — CONSULT (OUTPATIENT)
Dept: ONCOLOGY | Facility: CLINIC | Age: 50
End: 2025-05-21
Payer: COMMERCIAL

## 2025-05-21 VITALS
DIASTOLIC BLOOD PRESSURE: 80 MMHG | HEIGHT: 70 IN | HEART RATE: 116 BPM | WEIGHT: 230 LBS | RESPIRATION RATE: 20 BRPM | SYSTOLIC BLOOD PRESSURE: 144 MMHG | BODY MASS INDEX: 32.93 KG/M2

## 2025-05-21 VITALS
RESPIRATION RATE: 20 BRPM | SYSTOLIC BLOOD PRESSURE: 144 MMHG | BODY MASS INDEX: 32.93 KG/M2 | HEART RATE: 116 BPM | WEIGHT: 230 LBS | HEIGHT: 70 IN | DIASTOLIC BLOOD PRESSURE: 80 MMHG

## 2025-05-21 DIAGNOSIS — C50.212 MALIGNANT NEOPLASM OF UPPER-INNER QUADRANT OF LEFT BREAST IN FEMALE, ESTROGEN RECEPTOR POSITIVE: Primary | ICD-10-CM

## 2025-05-21 DIAGNOSIS — C50.912 MALIGNANT NEOPLASM OF LEFT BREAST IN FEMALE, ESTROGEN RECEPTOR POSITIVE, UNSPECIFIED SITE OF BREAST: Primary | ICD-10-CM

## 2025-05-21 DIAGNOSIS — Z17.0 MALIGNANT NEOPLASM OF LEFT BREAST IN FEMALE, ESTROGEN RECEPTOR POSITIVE, UNSPECIFIED SITE OF BREAST: Primary | ICD-10-CM

## 2025-05-21 DIAGNOSIS — Z17.0 MALIGNANT NEOPLASM OF UPPER-INNER QUADRANT OF LEFT BREAST IN FEMALE, ESTROGEN RECEPTOR POSITIVE: Primary | ICD-10-CM

## 2025-05-21 PROCEDURE — 83001 ASSAY OF GONADOTROPIN (FSH): CPT | Performed by: INTERNAL MEDICINE

## 2025-05-21 PROCEDURE — 82670 ASSAY OF TOTAL ESTRADIOL: CPT | Performed by: INTERNAL MEDICINE

## 2025-05-21 PROCEDURE — G0463 HOSPITAL OUTPT CLINIC VISIT: HCPCS

## 2025-05-21 PROCEDURE — 36415 COLL VENOUS BLD VENIPUNCTURE: CPT | Performed by: INTERNAL MEDICINE

## 2025-05-21 RX ORDER — VENLAFAXINE HCL 75 MG
75 CAPSULE, EXT RELEASE 24 HR ORAL DAILY
COMMUNITY
Start: 2024-12-16

## 2025-05-21 NOTE — LETTER
"May 21, 2025     JENNIFER Ivey  [address]  [facility] [address]    Patient: Tanvi Barrett   YOB: 1975   Date of Visit: 5/21/2025     Dear JENNIFER Ivey:       Thank you for referring Tanvi Barrett to me for evaluation. Below are the relevant portions of my assessment and plan of care.    If you have questions, please do not hesitate to call me. I look forward to following Tanvi along with you.         Sincerely,        Estella Craig MD        CC: MD Kiara Wagoner Amy M, MD  05/21/25 0197  Sign when Signing Visit    Subjective    PROBLEM LIST:  [id number] ER positive (95%, 3+) CT positive (95%, 3+) HER2 negative (1+) invasive ductal carcinoma of the left breast  Left breast lumpectomy on 5/12/2025.  Pathology showed an intermediate grade invasive ductal carcinoma measuring 9 mm.  Less than 0.1 mm anterior margin.  0 of 2 sentinel lymph nodes involved.  Depression  Hyperlipidemia  Hypertension    CHIEF COMPLAINT: Breast cancer      HISTORY OF PRESENT ILLNESS:  The patient is a 50 y.o. female, referred for evaluation of a recently diagnosed breast cancer.    She has had a lumpectomy.  She is recovering well from this.  She is here with her  today.  She lives in [place] and works as a .  They have 1 son still at home who is 18 and others who are grown.    She had a uterine ablation in 2018 for menorrhagia.  She started having symptoms of joint pain and mood changes in her late 30s and has had hot flashes for the past year.  She did have some bleeding after her uterine ablation but no bleeding for the past year.    REVIEW OF SYSTEMS:  A 14 point review of systems was performed and is negative except as noted above.    Past Medical History:   Diagnosis Date   • Breast cancer 2025    Left   • Depression    • Hyperlipidemia    • Hypertension                Objective    /80   Pulse 116   Resp 20   Ht 177.8 cm (70\")   Wt 104 kg (230 " "lb)   BMI 33.00 kg/m²   Performance Status:  ECOG score: 0           General: well appearing female in no acute distress  Neuro: alert and oriented  HEENT: sclerae anicteric, oropharynx clear  Skin: no rashes, lesions, bruising, or petechiae  Psych: mood and affect appropriate    Lab Results   Component Value Date    WBC 7.68 05/07/2025    HGB 14.8 05/07/2025    HCT 44.0 05/07/2025    MCV 93.8 05/07/2025     05/07/2025     Lab Results   Component Value Date    GLUCOSE 90 05/07/2025    BUN 13 05/07/2025    CREATININE 1.09 (H) 05/07/2025    BCR 11.9 05/07/2025    K 4.3 05/07/2025    CO2 25.0 05/07/2025    CALCIUM 9.9 05/07/2025    ALBUMIN 4.5 05/07/2025    AST 69 (H) 05/07/2025    ALT 97 (H) 05/07/2025       Mammo Post Device Placement Left  ULTRASOUND GUIDED NEEDLE LOCALIZATION: Left breast     HISTORY: Localization of known biopsy-proven malignancy left breast     PROCEDURE: After obtaining informed consent and performing \"time-out\"  the left breast was prepped and draped in usual sterile fashion. 1%  lidocaine without epinephrine was utilized for local anesthesia. A 5 cm  Kopans needle was placed into the lateral position under direct  sonographic guidance, a wire was then placed through the needle and the  needle was removed from the breast.     Routine left digital mammographic images were obtained with a BB placed  on the skin edge. The mass is located at the distal portion of the thick  part of the wire, about 4 cm from the lateral skin edge. Mammographic  images were available to the operating room. No complications occurred  during this procedure.     SUMMARY: Successful needle localization of known biopsy-proven  malignancy left breast at 11:00.     No BI-RADS category is necessary.     Pathology demonstrated invasive ductal carcinoma, intermediate grade, 9  mm in maximal extent. Limited associated ductal carcinoma in situ, high  grade, micropapillary pattern, not extensive. Invasive carcinoma " present  within less than 0.1 mm of anterior margin. DCIS present 2 mm from  anterior margin.     Surgical consultation is recommended.        5/19/2025 9:29 AM by Jess Shultz MD on Workstation: [id number]               ASSESSMENT AND PLAN:     Tanvi Barrett is a 50 y.o. female with a stage Ia ER positive HER2 negative invasive ductal carcinoma of the left breast.    We discussed the use of the Oncotype recurrence score.  This test provides information about the biology and risk of recurrence for ER positive Her2-negative breast cancers.  It is clear from previous studies that patients who have a low risk Oncotype do not benefit from adjuvant chemotherapy.  Conversely, patients with a high risk Oncotype can have a significant benefit from adjuvant chemotherapy.  Scores in the intermediate risk group may have a small benefit based on the patient's age.  We discussed that the Oncotype test is most useful if chemotherapy is an option that the patient is considering.     Regardless of the decision about chemotherapy, she is a candidate for adjuvant endocrine therapy.   Based on her symptoms and history I think she likely is in menopause and we will do hormone levels today to confirm this.  We discussed adjuvant endocrine therapy with an aromatase inhibitor.  We reviewed the potential side effects of anastrozole including hot flashes, vaginal dryness, joint pain, decrease in bone density, and mood or sleep disturbance.  She is concerned about many of the side effects as she already is experiencing many of the symptoms.  I told her that every person is different in terms of how much this medication affects them and I would encourage her to try the medicine before making any decisions about continuing for a longer period of time.  We also discussed that there is things we can do to help manage many of the side effects.  ]    F/u 2 weeks to review oncotype result.             A total greater than 60 mins minutes  was spent in face to face patient time, examination, counseling, charting, reviewing test results, and reviewing outside records.    Estella Craig MD    5/21/2025

## 2025-05-21 NOTE — PROGRESS NOTES
New Patient Office Visit      Encounter Date: 05/21/2025   Patient Name: Tanvi Barrett  YOB: 1975   Medical Record Number: [id number]   Primary Diagnosis: Malignant neoplasm of left breast in female, estrogen receptor positive, unspecified site of breast [C50.912, Z17.0]   Cancer Staging: Cancer Staging   No matching staging information was found for the patient.      Chief Complaint:    Chief Complaint   Patient presents with    Breast Cancer       History of Present Illness: Tanvi Barrett is a 50 y.o. female who is here for consultation regarding her +/+/- int grade invasive ductal carcinoma of the left breast. This was diagnosed following abnormal screening mammography. Diagnostic mammography/US, US guided biopsy results, and bilateral breast MRI results have been reviewed.   She had a left lumpectomy and SLN eval on 5/12/24. This revealed a 9 mm int grade IDC with limited associated DCIS. Extended margins were taken - closest distance to invasive cancer was .1mm and to DCIS was 2mm. No LVI. 0/2 SLN involved. pT1bN0(sn)  She is healing well. No fever, discharge, drainage.  She is accompanied by her supportive . They are both very active and enjoy travelling.     Age at menarche:  11  Age at menopause:  49  Hormone replacement therapy:  No  Personal history of breast cancer:  No  Family history of breast cancer:  No  Radiation to chest before age of 31:  No  Age of first live birth:  28    Prior Radiation History: no  Pacemaker or ICD: no  Pregnant or Nursing: no    Subjective      Review of Systems: Review of Systems   Constitutional:  Positive for fatigue.   Endocrine: Positive for heat intolerance.        Pt reports hot flashes that began 1 year ago w/ the beginning of menopause       Past Medical History:   Past Medical History:   Diagnosis Date    Breast cancer 2025    Left    Depression     Hyperlipidemia     Hypertension        Past  "Surgical History:   Past Surgical History:   Procedure Laterality Date    AUGMENTATION MAMMAPLASTY Bilateral     AUGUST 2021    BREAST LUMPECTOMY Left 05/12/2025    CHOLECYSTECTOMY      LIPOSUCTION ABDOMEN AND FLANK  08/2021    \"Tummy Tuck\" & Abdominal Liposuction       Family History:   Family History   Problem Relation Age of Onset    Lung cancer Father     Breast cancer Neg Hx        Social History:   Social History     Socioeconomic History    Marital status:    Tobacco Use    Smoking status: Never    Smokeless tobacco: Never   Vaping Use    Vaping status: Never Used   Substance and Sexual Activity    Alcohol use: Yes     Comment: socially     Drug use: Never    Sexual activity: Defer       Medications:     Current Outpatient Medications:     Atorvastatin Calcium (LIPITOR PO), Take  by mouth., Disp: , Rfl:     Effexor XR 75 MG 24 hr capsule, Take 1 capsule by mouth Daily., Disp: , Rfl:     LISINOPRIL PO, Take  by mouth., Disp: , Rfl:     HYDROcodone-acetaminophen (NORCO) 5-325 MG per tablet, Take 1 tablet by mouth 3 times a day., Disp: 7 tablet, Rfl: 0    Allergies:   No Known Allergies      Advanced Care Plan: N Advanced Care Planning was discussed. The patient does not have a living will documented in the medical record and declined to perform one today.  KPS/Quality of Life: 90 - Limited Activity  ECOG: (1) Restricted in physically strenuous activity, ambulatory and able to do work of light nature      Objective     Physical Exam:   Vital Signs:   Vitals:    05/21/25 1113   BP: 144/80   Pulse: 116   Resp: 20   Weight: 104 kg (230 lb)   Height: 177.8 cm (70\")   PainSc: 0-No pain     Body mass index is 33 kg/m².     Constitutional: The patient is a well-developed, well-nourished female  in no acute distress.  Alert and oriented ×3.  General: NAD, sitting comfortably  Eye: EOMI, anicteric sclerae  HENT: NC/AT, MMM  Neck: No JVD or cervical lymphadenopathy  Respiratory: Symmetric expansion, nonlabored " respiration  Cardiovascular: Regular rate and rhythm.  No murmurs, rubs, or gallops are appreciated.  Abdomen: nontender, nondistended.   Musculoskeletal: No obvious joint deformities, range of motion intact in all 4 extremities  Neuro: Alert oriented x3, cranial nerves III through XII are grossly intact, with no focal neurological deficits noted on exam.  Psych: Mood and affect appropriate        Assessment / Plan      Assessment/Plan:   Tanvi Barrett is a pleasant 50 y.o. female with a pT1bN0(sn) +/+/- invasive ductal carcinoma of the left breast managed with a left lumpectomy and SLN eval on 5/12/25.     She understands that the role of radiation therapy after a lumpectomy is to decrease the risk of an ipsilateral breast tumor recurrence. She is interested in proceeding with treatment.     Because of her young age , we discussed the modest improvement in local control gained from a tumor bed boost, at the slight expense of cosmetic outcome. She is interested in the addition of a tumor bed boost.    I anticipate treating her whole breast to a dose of 40 Gy/15 fractions with the addition of a 10 Gy/5 fraction tumor bed boost.     We discussed anticipated cosmetic outcomes as well as acute and late toxicities associated with radiation therapy. For her case, radiation related toxicity would be secondary to effects of radiation on the skin, remaining breast tissue, ipsilateral lung, and heart. Acute toxicities include fatigue, dermatitis, changes in skin pigmentation, and pneumonitis. Late toxicities include telangectasias, soft tissue fibrosis, pulmonary fibrosis, lymphedema, and an increased personal risk of cardiac events. She understands that while radiation is used as an important part of management with her existing diagnosis, there is a small possibility that she may develop a radiation-induced secondary malignancy in the treatment field in the future.      She has an upcoming appointment in medical  oncology to discuss recommendations regarding oncotype and endocrine/hormonal therapy. If chemotherapy is indicated, we discussed sequencing radiation after completion. If she will not be having chemotherapy, I would like to allow ~6 weeks for wound healing after surgery and start treatment shortly afterwards. We discussed our institutional reccomendation of starting radiation within 60 days of surgery in patients who do not have chemotherapy. She will return in 3-4 weeks for reevaluation.       Diagnoses and all orders for this visit:    1. Malignant neoplasm of left breast in female, estrogen receptor positive, unspecified site of breast (Primary)         Follow Up:  No follow-ups on file.      Time:   I spent 65 minutes on this encounter today, 05/23/25. Activities that took place during this time include: preparing to see the patient, obtaining history, reviewing separately obtained history, performing a medically appropriate examination and evaluation, counseling and educating the patient, ordering medications/tests/procedures, communicating with other healthcare providers, and coordinating care for this patient.     Sincerely,        Sabrina Myrick MD  Radiation Oncology  This document has been signed by Sabrina Myrick MD on May 23, 2025 11:00 EDT     NOTICE TO PATIENTS:   At [facility], we believe that sharing information builds trust and better relationships. You are receiving this note because you recently visited [facility]. It is possible you will see health information before a provider has talked with you about it. This kind of information can be easy to misunderstand. To help you fully understand what it means for your health, we urge you to discuss this note with your provider.

## 2025-05-22 ENCOUNTER — TELEPHONE (OUTPATIENT)
Dept: RADIATION ONCOLOGY | Facility: HOSPITAL | Age: 50
End: 2025-05-22
Payer: COMMERCIAL

## 2025-05-22 LAB
ESTRADIOL SERPL HS-MCNC: 240 PG/ML
FSH SERPL-ACNC: 19.1 MIU/ML

## 2025-05-22 NOTE — TELEPHONE ENCOUNTER
Patient returned my call to schedule Re-Evaluation with Dr. Myrick for Friday, 6/6/25 at 11:00 am at [facility].  Will need Oncotype result from appointment with Dr. Craig on 6/4/25. Patient verbalized an understanding of date, time, and location of appointment.   Provided contact information and patient will call to cancel Ran/Onc Re-Eval if Oncotype is high and Chemo is recommended.

## 2025-05-23 PROBLEM — C50.912 MALIGNANT NEOPLASM OF LEFT BREAST IN FEMALE, ESTROGEN RECEPTOR POSITIVE: Status: ACTIVE | Noted: 2025-05-23

## 2025-05-23 PROBLEM — Z17.0 MALIGNANT NEOPLASM OF LEFT BREAST IN FEMALE, ESTROGEN RECEPTOR POSITIVE: Status: ACTIVE | Noted: 2025-05-23

## 2025-06-02 LAB
CYTO UR: NORMAL
LAB AP CASE REPORT: NORMAL
LAB AP CLINICAL INFORMATION: NORMAL
LAB AP DIAGNOSIS COMMENT: NORMAL
LAB AP GENOMIC HEALTH, ADDENDUM: NORMAL
LAB AP SYNOPTIC CHECKLIST: NORMAL
PATH REPORT.FINAL DX SPEC: NORMAL
PATH REPORT.GROSS SPEC: NORMAL

## 2025-06-03 ENCOUNTER — TELEMEDICINE (OUTPATIENT)
Dept: ONCOLOGY | Facility: CLINIC | Age: 50
End: 2025-06-03
Payer: COMMERCIAL

## 2025-06-03 DIAGNOSIS — C50.212 MALIGNANT NEOPLASM OF UPPER-INNER QUADRANT OF LEFT BREAST IN FEMALE, ESTROGEN RECEPTOR POSITIVE: Primary | ICD-10-CM

## 2025-06-03 DIAGNOSIS — Z17.0 MALIGNANT NEOPLASM OF UPPER-INNER QUADRANT OF LEFT BREAST IN FEMALE, ESTROGEN RECEPTOR POSITIVE: Primary | ICD-10-CM

## 2025-06-03 PROCEDURE — 99214 OFFICE O/P EST MOD 30 MIN: CPT | Performed by: INTERNAL MEDICINE

## 2025-06-03 RX ORDER — TAMOXIFEN CITRATE 20 MG/1
20 TABLET ORAL DAILY
Qty: 30 TABLET | Refills: 11 | Status: SHIPPED | OUTPATIENT
Start: 2025-06-03

## 2025-06-03 NOTE — PROGRESS NOTES
You have chosen to receive care through a telehealth visit.  Do you consent to use a video/audio connection for your medical care today? Yes        PROBLEM LIST:  [id number] ER positive (95%, 3+) OH positive (95%, 3+) HER2 negative (1+) invasive ductal carcinoma of the left breast  Left breast lumpectomy on 5/12/2025.  Pathology showed an intermediate grade invasive ductal carcinoma measuring 9 mm.  Less than 0.1 mm anterior margin.  0 of 2 sentinel lymph nodes involved.  Oncotype score 21  Depression  Hyperlipidemia  Hypertension       Subjective     CHIEF COMPLAINT: Breast cancer    HISTORY OF PRESENT ILLNESS:   Tanvi Barrett returns for follow-up.   She presents to review her Oncotype results.      Objective      There were no vitals taken for this visit.     Performance Status:                General: well appearing female in no acute distress  Neuro: alert and oriented  HEENT: sclera anicteric, oropharynx clear  Lymphatics: No masses by inspection  Lungs: Respiratory effort appears normal  Skin: no rashes, lesions, bruising, or petechiae  Psych: mood and affect appropriate          RECENT LABS:  Lab Results   Component Value Date    WBC 7.68 05/07/2025    HGB 14.8 05/07/2025    HCT 44.0 05/07/2025    MCV 93.8 05/07/2025     05/07/2025       Lab Results   Component Value Date    GLUCOSE 90 05/07/2025    BUN 13 05/07/2025    CREATININE 1.09 (H) 05/07/2025    BCR 11.9 05/07/2025    K 4.3 05/07/2025    CO2 25.0 05/07/2025    CALCIUM 9.9 05/07/2025    ALBUMIN 4.5 05/07/2025    AST 69 (H) 05/07/2025    ALT 97 (H) 05/07/2025           .        ASSESSMENT AND PLAN:     Tanvi Barrett is a 50 y.o. female with a stage Ia ER positive HER2 negative invasive ductal carcinoma of the left breast.     She has an Oncotype score of 21.  Given her age and tumor features, this is associated with a small potential benefit of chemotherapy, estimated about 1%.  I think it is hard to justify the risks and  toxicities of chemotherapy for such a small benefit and she is in agreement.    Her previous blood testing showed that she is perimenopausal.  We discussed starting treatment with tamoxifen and likely transition to an aromatase inhibitor when possible.  We discussed potential side effects of tamoxifen treatment including an increased risk of blood clots, and increased risk of uterine cancer, hot flashes, and sleep or mood disturbance.    I will go ahead and send a prescription for tamoxifen to her pharmacy.  She can start taking this after she completes radiation treatment.  We will see her back in late September to see how she is tolerating this.                        Estella Craig MD  [facility] Hematology and Oncology    6/3/2025          CC:

## 2025-06-06 ENCOUNTER — HOSPITAL ENCOUNTER (OUTPATIENT)
Dept: RADIATION ONCOLOGY | Facility: HOSPITAL | Age: 50
Setting detail: RADIATION/ONCOLOGY SERIES
Discharge: HOME | End: 2025-06-06
Payer: COMMERCIAL

## 2025-06-06 ENCOUNTER — OFFICE VISIT (OUTPATIENT)
Dept: RADIATION ONCOLOGY | Facility: HOSPITAL | Age: 50
End: 2025-06-06
Payer: COMMERCIAL

## 2025-06-06 ENCOUNTER — TELEPHONE (OUTPATIENT)
Dept: RADIATION ONCOLOGY | Facility: HOSPITAL | Age: 50
End: 2025-06-06
Payer: COMMERCIAL

## 2025-06-06 VITALS
BODY MASS INDEX: 28.46 KG/M2 | DIASTOLIC BLOOD PRESSURE: 79 MMHG | HEART RATE: 85 BPM | SYSTOLIC BLOOD PRESSURE: 142 MMHG | WEIGHT: 203.3 LBS | OXYGEN SATURATION: 97 % | HEIGHT: 71 IN | TEMPERATURE: 98.5 F | RESPIRATION RATE: 16 BRPM

## 2025-06-06 DIAGNOSIS — Z17.0 MALIGNANT NEOPLASM OF LEFT BREAST IN FEMALE, ESTROGEN RECEPTOR POSITIVE, UNSPECIFIED SITE OF BREAST: Primary | ICD-10-CM

## 2025-06-06 DIAGNOSIS — C50.912 MALIGNANT NEOPLASM OF LEFT BREAST IN FEMALE, ESTROGEN RECEPTOR POSITIVE, UNSPECIFIED SITE OF BREAST: Primary | ICD-10-CM

## 2025-06-06 PROCEDURE — G0463 HOSPITAL OUTPT CLINIC VISIT: HCPCS

## 2025-06-06 NOTE — TELEPHONE ENCOUNTER
Called patient's preferred pharmacy and left a VM to prescribe Mometasone Fuorate 0.1% ointment.  Patient is apply ointment to affected breast/chest area bid on the first day of radiation treatments and will continue for 2 weeks after the completion of radiation treatments.  Instructions for medication were discussed with patient today during Re-evaluation.  Provided contact information should clarification be required.

## 2025-06-06 NOTE — PROGRESS NOTES
Follow Up Office Visit      Encounter Date: 06/06/2025   Patient Name: Tanvi Barrett  YOB: 1975   Medical Record Number: [id number]   Primary Diagnosis: Malignant neoplasm of left breast in female, estrogen receptor positive, unspecified site of breast [C50.912, Z17.0]   Cancer Staging: Cancer Staging   No matching staging information was found for the patient.                Cancer Staging   No matching staging information was found for the patient.          Chief Complaint:    Chief Complaint   Patient presents with    Breast Cancer       Oncologic History: Tanvi Barrett is a 50 y.o. female regarding her +/+/- int grade invasive ductal carcinoma of the left breast. This was diagnosed following abnormal screening mammography. Diagnostic mammography/US, US guided biopsy results, and bilateral breast MRI results have been reviewed.   She had a left lumpectomy and SLN eval on 5/12/24. This revealed a 9 mm int grade IDC with limited associated DCIS. Extended margins were taken - closest distance to invasive cancer was .1mm and to DCIS was 2mm. No LVI. 0/2 SLN involved. pT1bN0(sn)  She is healing well. No fever, discharge, drainage.  She is accompanied by her supportive . They are both very active and enjoy travelling. Chemotherapy not planned. Will take tamoxifen.                Subjective      Review of Systems: Review of Systems   All other systems reviewed and are negative.      The following portions of the patient's history were reviewed and updated as appropriate: allergies, current medications, past family history, past medical history, past social history, past surgical history and problem list.    Measures:   KPS/Quality of Life: 80 - Restricted Physical Activity      Objective     Physical Exam:   Vital Signs:   Vitals:    06/06/25 1058   BP: 142/79   Pulse: 85   Resp: 16   Temp: 98.5 °F (36.9 °C)   TempSrc: Temporal   SpO2: 97%  "  Weight: 92.2 kg (203 lb 4.8 oz)   Height: 180.3 cm (71\")   PainSc: 0-No pain     Body mass index is 28.35 kg/m².     Constitutional: No acute distress, sitting comfortably  Eye: EOMI, anicteric sclerae  HENT: NC/AT, MMM   Respiratory: Symmetric expansion, nonlabored respiration  MSK: ROM intact in all four extremities, no obvious deformities  Neuro: Alert, oriented x3, CN3-12 grossly intact.   Psych: Appropriate mood and affect.  Breast: left breast incisions healed well            Assessment / Plan        Assessment/Plan:     Diagnoses and all orders for this visit:    1. Malignant neoplasm of left breast in female, estrogen receptor positive, unspecified site of breast (Primary)        Tanvi Barrett is a pleasant 50 y.o. female with  a pT1bN0(sn) +/+/- invasive ductal carcinoma of the left breast managed with a left lumpectomy and SLN eval on 5/12/25.      She understands that the role of radiation therapy after a lumpectomy is to decrease the risk of an ipsilateral breast tumor recurrence. She is interested in proceeding with treatment.      Because of her young age , we discussed the modest improvement in local control gained from a tumor bed boost, at the slight expense of cosmetic outcome. She is interested in the addition of a tumor bed boost.     I anticipate treating her whole breast to a dose of 40 Gy/15 fractions with the addition of a 10 Gy/5 fraction tumor bed boost. We discussed the acute and late toxicity profile of treatment. I will call in [employer]yl[employer] for her to use during her treatment course. Consent was obtained and she will return for simulation. We will schedule her CT simulation once she returns from a planned trip to [facility].       Follow Up:   Return in about 4 weeks (around 7/4/2025) for CT SIM next visit.        Time:   I spent 35 minutes on this encounter today, 06/06/25. Activities that took place during this time include:   - preparing to see the patient  - obtaining and " reviewing separately obtained history  - performing a medically appropriate examination and evaluation  - counseling and educating the patient  - ordering medications/tests/procedures  - communicating with other healthcare providers  - documenting clinical information in the health record  - coordinating care for this patient.     Sincerely,        Sabrina Myrick MD  Radiation Oncology  This document has been signed by Sabrina Myrick MD on June 6, 2025 15:14 EDT           NOTICE TO PATIENTS  At [facility], we believe that sharing information builds trust and better relationships. You are receiving this note because you recently visited [facility]. It is possible you will see health information before a provider has talked with you about it. This kind of information can be easy to misunderstand. To help you fully understand what it means for your health, we urge you to discuss this note with your provider.

## 2025-07-07 ENCOUNTER — HOSPITAL ENCOUNTER (OUTPATIENT)
Dept: RADIATION ONCOLOGY | Facility: HOSPITAL | Age: 50
Setting detail: RADIATION/ONCOLOGY SERIES
Discharge: HOME | End: 2025-07-07
Payer: COMMERCIAL

## 2025-07-07 DIAGNOSIS — Z17.0 MALIGNANT NEOPLASM OF LEFT BREAST IN FEMALE, ESTROGEN RECEPTOR POSITIVE, UNSPECIFIED SITE OF BREAST: Primary | ICD-10-CM

## 2025-07-07 DIAGNOSIS — C50.912 MALIGNANT NEOPLASM OF LEFT BREAST IN FEMALE, ESTROGEN RECEPTOR POSITIVE, UNSPECIFIED SITE OF BREAST: Primary | ICD-10-CM

## 2025-07-07 LAB — B-HCG UR QL: NEGATIVE

## 2025-07-07 PROCEDURE — 77290 THER RAD SIMULAJ FIELD CPLX: CPT | Performed by: RADIOLOGY

## 2025-07-07 PROCEDURE — 81025 URINE PREGNANCY TEST: CPT | Performed by: RADIOLOGY

## 2025-07-07 PROCEDURE — 77332 RADIATION TREATMENT AID(S): CPT | Performed by: RADIOLOGY

## 2025-07-11 PROCEDURE — 77295 3-D RADIOTHERAPY PLAN: CPT | Performed by: RADIOLOGY

## 2025-07-11 PROCEDURE — 77300 RADIATION THERAPY DOSE PLAN: CPT | Performed by: RADIOLOGY

## 2025-07-11 PROCEDURE — 77334 RADIATION TREATMENT AID(S): CPT | Performed by: RADIOLOGY

## 2025-07-14 ENCOUNTER — HOSPITAL ENCOUNTER (OUTPATIENT)
Dept: RADIATION ONCOLOGY | Facility: HOSPITAL | Age: 50
Discharge: HOME | End: 2025-07-14
Payer: COMMERCIAL

## 2025-07-14 ENCOUNTER — PATIENT MESSAGE (OUTPATIENT)
Dept: ONCOLOGY | Facility: CLINIC | Age: 50
End: 2025-07-14
Payer: COMMERCIAL

## 2025-07-14 ENCOUNTER — TELEPHONE (OUTPATIENT)
Dept: RADIATION ONCOLOGY | Facility: HOSPITAL | Age: 50
End: 2025-07-14
Payer: COMMERCIAL

## 2025-07-14 VITALS — WEIGHT: 209.2 LBS | BODY MASS INDEX: 29.18 KG/M2

## 2025-07-14 PROCEDURE — 77280 THER RAD SIMULAJ FIELD SMPL: CPT | Performed by: RADIOLOGY

## 2025-07-14 RX ORDER — SODIUM CHLORIDE 9 MG/ML
999 INJECTION, SOLUTION INTRAVENOUS DAILY PRN
Status: CANCELLED | OUTPATIENT
Start: 2025-07-16 | End: 2025-08-15

## 2025-07-14 NOTE — TELEPHONE ENCOUNTER
Called patient to let her know that she can receive IVFs on Tuesday, 7/15/25 and Friday, 7/18/25  at 2:30 pm following 2:00 pm radiation treatment.  Patient verbalized an understanding of dates, times, and location of infusions.  Provided contact number should questions or concerns arise.

## 2025-07-15 ENCOUNTER — HOSPITAL ENCOUNTER (OUTPATIENT)
Dept: ONCOLOGY | Facility: HOSPITAL | Age: 50
Discharge: HOME | End: 2025-07-15
Admitting: RADIOLOGY
Payer: COMMERCIAL

## 2025-07-15 ENCOUNTER — HOSPITAL ENCOUNTER (OUTPATIENT)
Dept: RADIATION ONCOLOGY | Facility: HOSPITAL | Age: 50
Discharge: HOME | End: 2025-07-15

## 2025-07-15 VITALS
TEMPERATURE: 97.4 F | RESPIRATION RATE: 18 BRPM | DIASTOLIC BLOOD PRESSURE: 72 MMHG | BODY MASS INDEX: 30.93 KG/M2 | HEART RATE: 84 BPM | HEIGHT: 71 IN | SYSTOLIC BLOOD PRESSURE: 122 MMHG | WEIGHT: 220.9 LBS

## 2025-07-15 DIAGNOSIS — Z17.0 MALIGNANT NEOPLASM OF LEFT BREAST IN FEMALE, ESTROGEN RECEPTOR POSITIVE, UNSPECIFIED SITE OF BREAST: Primary | ICD-10-CM

## 2025-07-15 DIAGNOSIS — C50.912 MALIGNANT NEOPLASM OF LEFT BREAST IN FEMALE, ESTROGEN RECEPTOR POSITIVE, UNSPECIFIED SITE OF BREAST: Primary | ICD-10-CM

## 2025-07-15 PROCEDURE — 25810000003 SODIUM CHLORIDE 0.9 % SOLUTION: Performed by: RADIOLOGY

## 2025-07-15 PROCEDURE — 77387 GUIDANCE FOR RADJ TX DLVR: CPT | Performed by: RADIOLOGY

## 2025-07-15 PROCEDURE — 25010000002 ONDANSETRON PER 1 MG: Performed by: RADIOLOGY

## 2025-07-15 PROCEDURE — 77412 RADIATION TX DELIVERY LVL 3: CPT | Performed by: RADIOLOGY

## 2025-07-15 PROCEDURE — 96361 HYDRATE IV INFUSION ADD-ON: CPT

## 2025-07-15 PROCEDURE — 96365 THER/PROPH/DIAG IV INF INIT: CPT

## 2025-07-15 RX ORDER — SODIUM CHLORIDE 9 MG/ML
999 INJECTION, SOLUTION INTRAVENOUS DAILY PRN
Status: DISCONTINUED | OUTPATIENT
Start: 2025-07-16 | End: 2025-07-16 | Stop reason: HOSPADM

## 2025-07-15 RX ORDER — SODIUM CHLORIDE 9 MG/ML
999 INJECTION, SOLUTION INTRAVENOUS DAILY PRN
Status: CANCELLED | OUTPATIENT
Start: 2025-07-16 | End: 2025-08-15

## 2025-07-15 RX ADMIN — ONDANSETRON 16 MG: 2 INJECTION INTRAMUSCULAR; INTRAVENOUS at 14:43

## 2025-07-15 RX ADMIN — SODIUM CHLORIDE 1000 ML/HR: 9 INJECTION, SOLUTION INTRAVENOUS at 14:40

## 2025-07-16 ENCOUNTER — HOSPITAL ENCOUNTER (OUTPATIENT)
Dept: RADIATION ONCOLOGY | Facility: HOSPITAL | Age: 50
Discharge: HOME | End: 2025-07-16

## 2025-07-16 PROCEDURE — 77387 GUIDANCE FOR RADJ TX DLVR: CPT | Performed by: RADIOLOGY

## 2025-07-16 PROCEDURE — 77412 RADIATION TX DELIVERY LVL 3: CPT | Performed by: RADIOLOGY

## 2025-07-17 ENCOUNTER — HOSPITAL ENCOUNTER (OUTPATIENT)
Dept: RADIATION ONCOLOGY | Facility: HOSPITAL | Age: 50
Discharge: HOME | End: 2025-07-17

## 2025-07-17 PROCEDURE — 77412 RADIATION TX DELIVERY LVL 3: CPT | Performed by: RADIOLOGY

## 2025-07-17 PROCEDURE — 77387 GUIDANCE FOR RADJ TX DLVR: CPT | Performed by: RADIOLOGY

## 2025-07-18 ENCOUNTER — HOSPITAL ENCOUNTER (OUTPATIENT)
Dept: ONCOLOGY | Facility: HOSPITAL | Age: 50
Discharge: HOME | End: 2025-07-18
Payer: COMMERCIAL

## 2025-07-18 ENCOUNTER — HOSPITAL ENCOUNTER (OUTPATIENT)
Dept: RADIATION ONCOLOGY | Facility: HOSPITAL | Age: 50
Discharge: HOME | End: 2025-07-18

## 2025-07-18 VITALS
SYSTOLIC BLOOD PRESSURE: 125 MMHG | TEMPERATURE: 97.1 F | HEIGHT: 71 IN | RESPIRATION RATE: 16 BRPM | WEIGHT: 220 LBS | HEART RATE: 99 BPM | DIASTOLIC BLOOD PRESSURE: 73 MMHG | BODY MASS INDEX: 30.8 KG/M2

## 2025-07-18 DIAGNOSIS — C50.912 MALIGNANT NEOPLASM OF LEFT BREAST IN FEMALE, ESTROGEN RECEPTOR POSITIVE, UNSPECIFIED SITE OF BREAST: Primary | ICD-10-CM

## 2025-07-18 DIAGNOSIS — Z17.0 MALIGNANT NEOPLASM OF LEFT BREAST IN FEMALE, ESTROGEN RECEPTOR POSITIVE, UNSPECIFIED SITE OF BREAST: Primary | ICD-10-CM

## 2025-07-18 PROCEDURE — 96365 THER/PROPH/DIAG IV INF INIT: CPT

## 2025-07-18 PROCEDURE — 77387 GUIDANCE FOR RADJ TX DLVR: CPT | Performed by: RADIOLOGY

## 2025-07-18 PROCEDURE — 25010000002 ONDANSETRON PER 1 MG: Performed by: RADIOLOGY

## 2025-07-18 PROCEDURE — 25810000003 SODIUM CHLORIDE 0.9 % SOLUTION: Performed by: RADIOLOGY

## 2025-07-18 PROCEDURE — 96361 HYDRATE IV INFUSION ADD-ON: CPT

## 2025-07-18 PROCEDURE — 77336 RADIATION PHYSICS CONSULT: CPT | Performed by: RADIOLOGY

## 2025-07-18 PROCEDURE — 77412 RADIATION TX DELIVERY LVL 3: CPT | Performed by: RADIOLOGY

## 2025-07-18 RX ORDER — SODIUM CHLORIDE 9 MG/ML
999 INJECTION, SOLUTION INTRAVENOUS DAILY PRN
OUTPATIENT
Start: 2025-07-19 | End: 2025-08-18

## 2025-07-18 RX ORDER — SODIUM CHLORIDE 9 MG/ML
999 INJECTION, SOLUTION INTRAVENOUS DAILY PRN
Status: DISCONTINUED | OUTPATIENT
Start: 2025-07-19 | End: 2025-07-19 | Stop reason: HOSPADM

## 2025-07-18 RX ADMIN — SODIUM CHLORIDE 799 ML/HR: 9 INJECTION, SOLUTION INTRAVENOUS at 14:43

## 2025-07-18 RX ADMIN — SODIUM CHLORIDE 16 MG: 9 INJECTION, SOLUTION INTRAVENOUS at 14:44

## 2025-07-21 ENCOUNTER — HOSPITAL ENCOUNTER (OUTPATIENT)
Dept: RADIATION ONCOLOGY | Facility: HOSPITAL | Age: 50
Discharge: HOME | End: 2025-07-21
Payer: COMMERCIAL

## 2025-07-21 VITALS — WEIGHT: 223.1 LBS | BODY MASS INDEX: 31.12 KG/M2

## 2025-07-21 PROCEDURE — 77387 GUIDANCE FOR RADJ TX DLVR: CPT | Performed by: RADIOLOGY

## 2025-07-21 PROCEDURE — 77412 RADIATION TX DELIVERY LVL 3: CPT | Performed by: RADIOLOGY

## 2025-07-22 ENCOUNTER — HOSPITAL ENCOUNTER (OUTPATIENT)
Dept: RADIATION ONCOLOGY | Facility: HOSPITAL | Age: 50
Discharge: HOME | End: 2025-07-22

## 2025-07-22 PROCEDURE — 77412 RADIATION TX DELIVERY LVL 3: CPT | Performed by: RADIOLOGY

## 2025-07-22 PROCEDURE — 77387 GUIDANCE FOR RADJ TX DLVR: CPT | Performed by: RADIOLOGY

## 2025-07-23 ENCOUNTER — HOSPITAL ENCOUNTER (OUTPATIENT)
Dept: RADIATION ONCOLOGY | Facility: HOSPITAL | Age: 50
Discharge: HOME | End: 2025-07-23

## 2025-07-23 PROCEDURE — 77412 RADIATION TX DELIVERY LVL 3: CPT | Performed by: RADIOLOGY

## 2025-07-23 PROCEDURE — 77387 GUIDANCE FOR RADJ TX DLVR: CPT | Performed by: RADIOLOGY

## 2025-07-24 ENCOUNTER — HOSPITAL ENCOUNTER (OUTPATIENT)
Dept: RADIATION ONCOLOGY | Facility: HOSPITAL | Age: 50
Discharge: HOME | End: 2025-07-24

## 2025-07-24 PROCEDURE — 77336 RADIATION PHYSICS CONSULT: CPT | Performed by: RADIOLOGY

## 2025-07-24 PROCEDURE — 77387 GUIDANCE FOR RADJ TX DLVR: CPT | Performed by: RADIOLOGY

## 2025-07-24 PROCEDURE — 77412 RADIATION TX DELIVERY LVL 3: CPT | Performed by: RADIOLOGY

## 2025-07-25 ENCOUNTER — HOSPITAL ENCOUNTER (OUTPATIENT)
Dept: RADIATION ONCOLOGY | Facility: HOSPITAL | Age: 50
Discharge: HOME | End: 2025-07-25

## 2025-07-25 PROCEDURE — 77412 RADIATION TX DELIVERY LVL 3: CPT | Performed by: RADIOLOGY

## 2025-07-25 PROCEDURE — 77387 GUIDANCE FOR RADJ TX DLVR: CPT | Performed by: RADIOLOGY

## 2025-07-28 ENCOUNTER — HOSPITAL ENCOUNTER (OUTPATIENT)
Dept: RADIATION ONCOLOGY | Facility: HOSPITAL | Age: 50
Discharge: HOME | End: 2025-07-28
Payer: COMMERCIAL

## 2025-07-28 VITALS — WEIGHT: 222.6 LBS | BODY MASS INDEX: 31.05 KG/M2

## 2025-07-28 PROCEDURE — 77387 GUIDANCE FOR RADJ TX DLVR: CPT | Performed by: RADIOLOGY

## 2025-07-28 PROCEDURE — 77412 RADIATION TX DELIVERY LVL 3: CPT | Performed by: RADIOLOGY

## 2025-07-29 ENCOUNTER — HOSPITAL ENCOUNTER (OUTPATIENT)
Dept: RADIATION ONCOLOGY | Facility: HOSPITAL | Age: 50
Discharge: HOME | End: 2025-07-29

## 2025-07-29 PROCEDURE — 77387 GUIDANCE FOR RADJ TX DLVR: CPT | Performed by: RADIOLOGY

## 2025-07-29 PROCEDURE — 77412 RADIATION TX DELIVERY LVL 3: CPT | Performed by: RADIOLOGY

## 2025-07-30 ENCOUNTER — HOSPITAL ENCOUNTER (OUTPATIENT)
Dept: RADIATION ONCOLOGY | Facility: HOSPITAL | Age: 50
Discharge: HOME | End: 2025-07-30

## 2025-07-30 PROCEDURE — 77300 RADIATION THERAPY DOSE PLAN: CPT | Performed by: RADIOLOGY

## 2025-07-30 PROCEDURE — 77387 GUIDANCE FOR RADJ TX DLVR: CPT | Performed by: RADIOLOGY

## 2025-07-30 PROCEDURE — 77412 RADIATION TX DELIVERY LVL 3: CPT | Performed by: RADIOLOGY

## 2025-07-31 ENCOUNTER — HOSPITAL ENCOUNTER (OUTPATIENT)
Dept: RADIATION ONCOLOGY | Facility: HOSPITAL | Age: 50
Discharge: HOME | End: 2025-07-31

## 2025-07-31 PROCEDURE — 77412 RADIATION TX DELIVERY LVL 3: CPT | Performed by: RADIOLOGY

## 2025-07-31 PROCEDURE — 77387 GUIDANCE FOR RADJ TX DLVR: CPT | Performed by: RADIOLOGY

## 2025-08-01 ENCOUNTER — HOSPITAL ENCOUNTER (OUTPATIENT)
Dept: RADIATION ONCOLOGY | Facility: HOSPITAL | Age: 50
Setting detail: RADIATION/ONCOLOGY SERIES
End: 2025-08-01
Payer: COMMERCIAL

## 2025-08-01 ENCOUNTER — HOSPITAL ENCOUNTER (OUTPATIENT)
Dept: RADIATION ONCOLOGY | Facility: HOSPITAL | Age: 50
Discharge: HOME | End: 2025-08-01

## 2025-08-01 PROCEDURE — 77412 RADIATION TX DELIVERY LVL 3: CPT | Performed by: RADIOLOGY

## 2025-08-01 PROCEDURE — 77387 GUIDANCE FOR RADJ TX DLVR: CPT | Performed by: RADIOLOGY

## 2025-08-01 PROCEDURE — 77336 RADIATION PHYSICS CONSULT: CPT | Performed by: RADIOLOGY

## 2025-08-04 ENCOUNTER — HOSPITAL ENCOUNTER (OUTPATIENT)
Dept: RADIATION ONCOLOGY | Facility: HOSPITAL | Age: 50
Discharge: HOME | End: 2025-08-04
Payer: COMMERCIAL

## 2025-08-04 VITALS — BODY MASS INDEX: 30.57 KG/M2 | WEIGHT: 219.2 LBS

## 2025-08-04 PROCEDURE — 77280 THER RAD SIMULAJ FIELD SMPL: CPT | Performed by: RADIOLOGY

## 2025-08-04 PROCEDURE — 77334 RADIATION TREATMENT AID(S): CPT | Performed by: RADIOLOGY

## 2025-08-04 PROCEDURE — 77387 GUIDANCE FOR RADJ TX DLVR: CPT | Performed by: RADIOLOGY

## 2025-08-04 PROCEDURE — 77412 RADIATION TX DELIVERY LVL 3: CPT | Performed by: RADIOLOGY

## 2025-08-05 ENCOUNTER — HOSPITAL ENCOUNTER (OUTPATIENT)
Dept: RADIATION ONCOLOGY | Facility: HOSPITAL | Age: 50
Discharge: HOME | End: 2025-08-05

## 2025-08-05 PROCEDURE — 77412 RADIATION TX DELIVERY LVL 3: CPT | Performed by: RADIOLOGY

## 2025-08-06 ENCOUNTER — HOSPITAL ENCOUNTER (OUTPATIENT)
Dept: RADIATION ONCOLOGY | Facility: HOSPITAL | Age: 50
Discharge: HOME | End: 2025-08-06

## 2025-08-06 PROCEDURE — 77412 RADIATION TX DELIVERY LVL 3: CPT | Performed by: RADIOLOGY

## 2025-08-07 ENCOUNTER — HOSPITAL ENCOUNTER (OUTPATIENT)
Dept: RADIATION ONCOLOGY | Facility: HOSPITAL | Age: 50
Discharge: HOME | End: 2025-08-07

## 2025-08-07 ENCOUNTER — TELEPHONE (OUTPATIENT)
Dept: RADIATION ONCOLOGY | Facility: HOSPITAL | Age: 50
End: 2025-08-07
Payer: COMMERCIAL

## 2025-08-07 PROCEDURE — 77336 RADIATION PHYSICS CONSULT: CPT | Performed by: RADIOLOGY

## 2025-08-07 PROCEDURE — 77412 RADIATION TX DELIVERY LVL 3: CPT | Performed by: RADIOLOGY

## 2025-08-08 ENCOUNTER — HOSPITAL ENCOUNTER (OUTPATIENT)
Dept: RADIATION ONCOLOGY | Facility: HOSPITAL | Age: 50
Discharge: HOME | End: 2025-08-08

## 2025-08-08 PROCEDURE — 77412 RADIATION TX DELIVERY LVL 3: CPT | Performed by: RADIOLOGY

## 2025-08-11 ENCOUNTER — HOSPITAL ENCOUNTER (OUTPATIENT)
Dept: RADIATION ONCOLOGY | Facility: HOSPITAL | Age: 50
Discharge: HOME | End: 2025-08-11
Payer: COMMERCIAL

## 2025-08-11 VITALS — WEIGHT: 218.7 LBS | BODY MASS INDEX: 30.5 KG/M2

## 2025-08-11 PROCEDURE — 77412 RADIATION TX DELIVERY LVL 3: CPT | Performed by: RADIOLOGY
